# Patient Record
Sex: FEMALE | Race: WHITE | NOT HISPANIC OR LATINO | Employment: FULL TIME | URBAN - METROPOLITAN AREA
[De-identification: names, ages, dates, MRNs, and addresses within clinical notes are randomized per-mention and may not be internally consistent; named-entity substitution may affect disease eponyms.]

---

## 2023-01-23 PROBLEM — G43.909 MIGRAINES: Status: ACTIVE | Noted: 2023-01-23

## 2023-01-23 PROBLEM — M79.605 PAIN IN BOTH LOWER EXTREMITIES: Status: ACTIVE | Noted: 2023-01-23

## 2023-01-23 PROBLEM — M79.604 PAIN IN BOTH LOWER EXTREMITIES: Status: ACTIVE | Noted: 2023-01-23

## 2023-01-23 PROBLEM — G47.33 SEVERE OBSTRUCTIVE SLEEP APNEA: Status: ACTIVE | Noted: 2023-01-23

## 2023-01-23 PROBLEM — F33.1 MODERATE EPISODE OF RECURRENT MAJOR DEPRESSIVE DISORDER (MULTI): Status: ACTIVE | Noted: 2023-01-23

## 2023-01-23 PROBLEM — J01.90 ACUTE SINUSITIS: Status: ACTIVE | Noted: 2023-01-23

## 2023-01-23 PROBLEM — F41.9 ANXIETY DISORDER: Status: ACTIVE | Noted: 2023-01-23

## 2023-01-23 PROBLEM — I82.409 DVT (DEEP VENOUS THROMBOSIS) (MULTI): Status: ACTIVE | Noted: 2023-01-23

## 2023-01-23 PROBLEM — J30.89 ENVIRONMENTAL AND SEASONAL ALLERGIES: Status: ACTIVE | Noted: 2023-01-23

## 2023-01-23 PROBLEM — R19.7 DIARRHEA: Status: ACTIVE | Noted: 2023-01-23

## 2023-01-23 PROBLEM — I26.99 PULMONARY EMBOLISM (MULTI): Status: ACTIVE | Noted: 2023-01-23

## 2023-01-23 RX ORDER — MONTELUKAST SODIUM 10 MG/1
1 TABLET ORAL DAILY
COMMUNITY
Start: 2020-05-28 | End: 2023-04-13

## 2023-01-23 RX ORDER — TOPIRAMATE 200 MG/1
1 TABLET ORAL NIGHTLY
COMMUNITY
End: 2023-03-07 | Stop reason: SINTOL

## 2023-01-23 RX ORDER — DULOXETIN HYDROCHLORIDE 30 MG/1
1 CAPSULE, DELAYED RELEASE ORAL DAILY
COMMUNITY
End: 2023-03-07 | Stop reason: SDUPTHER

## 2023-01-23 RX ORDER — MULTIVITAMIN
1 TABLET ORAL DAILY
COMMUNITY

## 2023-01-23 RX ORDER — CETIRIZINE HYDROCHLORIDE 10 MG/1
1 TABLET ORAL DAILY PRN
COMMUNITY

## 2023-01-23 RX ORDER — ACETAMINOPHEN 500 MG/1
CAPSULE, LIQUID FILLED ORAL
COMMUNITY
End: 2024-01-23 | Stop reason: HOSPADM

## 2023-01-23 RX ORDER — ERGOCALCIFEROL 1.25 MG/1
1 CAPSULE ORAL
COMMUNITY
Start: 2018-08-23 | End: 2024-01-05 | Stop reason: SDUPTHER

## 2023-01-23 RX ORDER — ATENOLOL 25 MG/1
1 TABLET ORAL DAILY
COMMUNITY
Start: 2022-02-23 | End: 2023-04-13 | Stop reason: SDUPTHER

## 2023-01-23 RX ORDER — LORATADINE 10 MG/1
1 CAPSULE, LIQUID FILLED ORAL DAILY
COMMUNITY

## 2023-03-07 ENCOUNTER — OFFICE VISIT (OUTPATIENT)
Dept: PRIMARY CARE | Facility: CLINIC | Age: 43
End: 2023-03-07
Payer: COMMERCIAL

## 2023-03-07 VITALS
RESPIRATION RATE: 16 BRPM | HEART RATE: 78 BPM | SYSTOLIC BLOOD PRESSURE: 109 MMHG | HEIGHT: 66 IN | BODY MASS INDEX: 33.11 KG/M2 | DIASTOLIC BLOOD PRESSURE: 78 MMHG | TEMPERATURE: 97.4 F | WEIGHT: 206 LBS | OXYGEN SATURATION: 98 %

## 2023-03-07 DIAGNOSIS — I26.99 PULMONARY EMBOLISM WITHOUT ACUTE COR PULMONALE, UNSPECIFIED CHRONICITY, UNSPECIFIED PULMONARY EMBOLISM TYPE (MULTI): ICD-10-CM

## 2023-03-07 DIAGNOSIS — G43.709 CHRONIC MIGRAINE WITHOUT AURA WITHOUT STATUS MIGRAINOSUS, NOT INTRACTABLE: ICD-10-CM

## 2023-03-07 DIAGNOSIS — F41.1 GENERALIZED ANXIETY DISORDER: ICD-10-CM

## 2023-03-07 DIAGNOSIS — G47.33 SEVERE OBSTRUCTIVE SLEEP APNEA: ICD-10-CM

## 2023-03-07 DIAGNOSIS — F33.1 MODERATE EPISODE OF RECURRENT MAJOR DEPRESSIVE DISORDER (MULTI): Primary | ICD-10-CM

## 2023-03-07 PROBLEM — J01.90 ACUTE SINUSITIS: Status: RESOLVED | Noted: 2023-01-23 | Resolved: 2023-03-07

## 2023-03-07 PROBLEM — R19.7 DIARRHEA: Status: RESOLVED | Noted: 2023-01-23 | Resolved: 2023-03-07

## 2023-03-07 PROCEDURE — 99214 OFFICE O/P EST MOD 30 MIN: CPT | Performed by: FAMILY MEDICINE

## 2023-03-07 PROCEDURE — 1036F TOBACCO NON-USER: CPT | Performed by: FAMILY MEDICINE

## 2023-03-07 RX ORDER — DULOXETIN HYDROCHLORIDE 60 MG/1
60 CAPSULE, DELAYED RELEASE ORAL DAILY
Qty: 30 CAPSULE | Refills: 11 | Status: SHIPPED | OUTPATIENT
Start: 2023-03-07 | End: 2023-09-07 | Stop reason: SDUPTHER

## 2023-03-07 ASSESSMENT — PAIN SCALES - GENERAL: PAINLEVEL: 0-NO PAIN

## 2023-03-07 NOTE — ASSESSMENT & PLAN NOTE
Anticoagulated on Xarelto.   Hypercoag workup from hematology was negative.  Vascular recommended life-long anticoagulation.

## 2023-03-07 NOTE — PROGRESS NOTES
Subjective   Patient ID: Michelle Gonzales is a 42 y.o. female who presents for Depression, Anxiety (Memory loss  with depression), and Migraine.  She is currently experiencing migraines with changes in barometric pressure. She is having about 1 migraine per week which can last for several days. She has been off Nurtec due to cost. Savings card given today. She is on topiramate for migraine prevention. She notes brain fog and cognitive issues for several months and wasn't sure if it is from topiramate or duloxitine.    She still has symptoms of depression and anxiety but overall she feels that her symptoms are controlled on duloxitine. The atenolol she is on helps with the physical symptoms of anxiety.         Current Outpatient Medications   Medication Sig Dispense Refill    acetaminophen 500 mg capsule TAKE AS DIRECTED.      atenolol (Tenormin) 25 mg tablet Take 1 tablet (25 mg) by mouth once daily.      cetirizine (ZyrTEC) 10 mg tablet Take 1 tablet (10 mg) by mouth once daily as needed.      ergocalciferol (Vitamin D-2) 1.25 MG (88002 UT) capsule Take 1 capsule (1,250 mcg) by mouth 1 (one) time per week.      loratadine (Claritin Liqui-Gel) 10 mg capsule Take 1 capsule by mouth 1 (one) time each day.      montelukast (Singulair) 10 mg tablet Take 1 tablet (10 mg) by mouth once daily. For allergies or asthma      multivitamin tablet Take 1 tablet by mouth once daily.      rimegepant (NURTEC) 75 mg tablet,disintegrating Take 1 tablet (75 mg) by mouth every other day. For migraine prevention      rivaroxaban (Xarelto) 10 mg tablet Take 1 tablet (10 mg) by mouth once daily.      DULoxetine (Cymbalta) 60 mg DR capsule Take 1 capsule (60 mg) by mouth once daily. 30 capsule 11     No current facility-administered medications for this visit.     Past Surgical History:   Procedure Laterality Date    OTHER SURGICAL HISTORY  10/04/2019    Bunionectomy    OTHER SURGICAL HISTORY  10/04/2019    Myringotomy with tube placement  "   OTHER SURGICAL HISTORY  09/15/2020    Colonoscopy    OTHER SURGICAL HISTORY  09/15/2020    Esophagogastroduodenoscopy    OTHER SURGICAL HISTORY  09/15/2020    [place] tooth extraction    OTHER SURGICAL HISTORY  02/23/2022    Total hysterectomy laparoscopic    OTHER SURGICAL HISTORY  06/14/2021    Ablation    OTHER SURGICAL HISTORY  11/02/2021    Bilateral salpingectomy    OTHER SURGICAL HISTORY  02/09/2021    Cholecystectomy    OTHER SURGICAL HISTORY  11/19/2019    Vascular surgical procedure     Family History   Problem Relation Name Age of Onset    Anxiety disorder Mother      Depression Mother      Hypertension Mother      Lung cancer Mother      Other (cardiac disorder) Father      Other (substance abuse) Brother      Stroke Maternal Grandmother      Coronary artery disease Maternal Grandmother      Hypertension Maternal Grandmother        Social History     Tobacco Use    Smoking status: Never    Smokeless tobacco: Never   Substance Use Topics    Alcohol use: Yes     Comment: social    Drug use: Never        Objective     Visit Vitals  /78 (BP Location: Left arm, Patient Position: Sitting, BP Cuff Size: Adult)   Pulse 78   Temp 36.3 °C (97.4 °F) (Temporal)   Resp 16   Ht 1.664 m (5' 5.5\")   Wt 93.4 kg (206 lb)   SpO2 98%   BMI 33.76 kg/m²   Smoking Status Never   BSA 2.08 m²        Constitutional: Well nourished, well developed, appears stated age  Eyes: no scleral icterus, no conjunctival injection  Respiratory: normal respiratory effort  Musculoskeletal: No gross deformities appreciated  Skin: Warm, dry.  Neurologic: Alert, CNs II-XII grossly intact..  Psych: Appropriate mood and affect.         Assessment/Plan   Problem List Items Addressed This Visit          Nervous    Severe obstructive sleep apnea     Dx: 8/2021  Uses CPAP            Circulatory    Pulmonary embolism (CMS/HCC)     Anticoagulated on Xarelto.   Hypercoag workup from hematology was negative.  Vascular recommended life-long " anticoagulation.            Other    Anxiety disorder     Controlled with Cymbalta and atenolol. Continue.         Migraines     Having cognitive symptoms possibly from topiramate, will wean her off this medication and increase atenolol which she is on for anxiety to try to help with migraine prevention. Patient will restart Nurtec if affordable with savings card.          Moderate episode of recurrent major depressive disorder (CMS/HCC) - Primary     Controlled on Cymbalta. Continue         Relevant Medications    DULoxetine (Cymbalta) 60 mg DR capsule     Follow up 6 months    Marlene Valderrama,

## 2023-03-07 NOTE — PATIENT INSTRUCTIONS
Thank you for choosing [facility] for your healthcare.   As always if you have any questions or concerns please do not hesitate to call our office at [phone] or through [employer].    Have a great day!  Marlene Valderrama, DO

## 2023-03-07 NOTE — ASSESSMENT & PLAN NOTE
Having cognitive symptoms possibly from topiramate, will wean her off this medication and increase atenolol which she is on for anxiety to try to help with migraine prevention. Patient will restart Nurtec if affordable with savings card.

## 2023-03-27 ENCOUNTER — PATIENT MESSAGE (OUTPATIENT)
Dept: PRIMARY CARE | Facility: CLINIC | Age: 43
End: 2023-03-27
Payer: COMMERCIAL

## 2023-04-10 DIAGNOSIS — J30.89 ENVIRONMENTAL AND SEASONAL ALLERGIES: Primary | ICD-10-CM

## 2023-04-12 ENCOUNTER — PATIENT MESSAGE (OUTPATIENT)
Dept: PRIMARY CARE | Facility: CLINIC | Age: 43
End: 2023-04-12
Payer: COMMERCIAL

## 2023-04-12 DIAGNOSIS — F41.1 GENERALIZED ANXIETY DISORDER: Primary | ICD-10-CM

## 2023-04-12 DIAGNOSIS — G43.709 CHRONIC MIGRAINE WITHOUT AURA WITHOUT STATUS MIGRAINOSUS, NOT INTRACTABLE: ICD-10-CM

## 2023-04-13 RX ORDER — AMITRIPTYLINE HYDROCHLORIDE 10 MG/1
10 TABLET, FILM COATED ORAL NIGHTLY
Qty: 30 TABLET | Refills: 2 | Status: SHIPPED | OUTPATIENT
Start: 2023-04-13 | End: 2023-06-30

## 2023-04-13 RX ORDER — ONDANSETRON 4 MG/1
TABLET, ORALLY DISINTEGRATING ORAL
COMMUNITY
Start: 2023-03-29 | End: 2023-09-07 | Stop reason: WASHOUT

## 2023-04-13 RX ORDER — ATENOLOL 50 MG/1
50 TABLET ORAL DAILY
Qty: 90 TABLET | Refills: 3 | Status: SHIPPED | OUTPATIENT
Start: 2023-04-13 | End: 2023-09-07 | Stop reason: SDUPTHER

## 2023-04-13 RX ORDER — TOPIRAMATE 200 MG/1
1 TABLET ORAL NIGHTLY
COMMUNITY
Start: 2022-12-11 | End: 2023-04-13 | Stop reason: SINTOL

## 2023-04-13 RX ORDER — MONTELUKAST SODIUM 10 MG/1
TABLET ORAL
Qty: 90 TABLET | Refills: 1 | Status: SHIPPED | OUTPATIENT
Start: 2023-04-13 | End: 2023-09-07 | Stop reason: SDUPTHER

## 2023-04-13 NOTE — TELEPHONE ENCOUNTER
From: Michelle Gonzales  To: Marlene Valderrama DO  Sent: 4/12/2023 5:10 PM EDT  Subject: New prescription inquiry    Good afternoon, Dr. Valderrama,    I wanted to provide you an update & also ask a question.     The Atenolol has been helping now that we have increased the dosage. I will be needing a script of the increased Atenolol soon as I will be out in a week or two.     Since stopping the Topirimate, I am still experiencing my migraines (at least one every other week). I have noticed, though, that I am able to think more clearly and I have better recollection, so thank you for taking me off of that.    My insurance will only cover one box of the Nurtec at a time, so unfortunately, I am not able to use it as a preventative migraine medication. The Nurtec works great after I get my migraine, but it takes a little bit of time to get in my system & by that point, I am unable to drive to work.     When I had a migraine a couple of weeks ago & went to Urgent Care, the N.P. advised of a migraine preventative that she wanted me to discuss with you.   The medication is called Amitriptyline . I am not sure if this is something I could take with my other medication or if there are other side effects I need to avoid. For example, brain fog, etc.     I thought I would at least ask, since I am still getting the migraines.    Thank you for your time and consideration.    Sincerely,  Michelle Gonzales

## 2023-06-13 ENCOUNTER — TELEPHONE (OUTPATIENT)
Dept: PRIMARY CARE | Facility: CLINIC | Age: 43
End: 2023-06-13
Payer: COMMERCIAL

## 2023-06-13 NOTE — TELEPHONE ENCOUNTER
Email from patient:    Mauri Valderrama,    I just wanted to touch base with you.  After we had changed my medications slightly, I wanted to give you an update:    60mg of Duloxetine = seems to be working alright.    10mg of Amitriptyline = seems to be working very well.  I have only had 1 migraine last month & hardly any headaches.      Thank you for your time.    P.S. - In addition, we had upped my dosage of Atenolol from 25mg to 50mg.  I have been taking two of my 25mg pills a day.  This new dosage seems to have been helping.  I still have anxiety, but my panic attacks have decreased, per week.      Thank you, again!    Sincerely,  Michelle

## 2023-06-13 NOTE — TELEPHONE ENCOUNTER
My emailed response:    Good morning Michelle,  Thank you for the update. I am so glad that the changes we made have been helpful.    Marlene Valderrama, DO

## 2023-06-30 DIAGNOSIS — G43.709 CHRONIC MIGRAINE WITHOUT AURA WITHOUT STATUS MIGRAINOSUS, NOT INTRACTABLE: ICD-10-CM

## 2023-06-30 PROBLEM — R10.30 LOWER ABDOMINAL PAIN: Status: ACTIVE | Noted: 2023-06-30

## 2023-06-30 PROBLEM — J01.90 ACUTE SINUSITIS: Status: ACTIVE | Noted: 2023-06-30

## 2023-06-30 PROBLEM — J32.9 RECURRENT SINUSITIS: Status: ACTIVE | Noted: 2023-06-30

## 2023-06-30 PROBLEM — R05.9 COUGH: Status: ACTIVE | Noted: 2023-06-30

## 2023-06-30 PROBLEM — R19.7 DIARRHEA: Status: ACTIVE | Noted: 2023-06-30

## 2023-06-30 PROBLEM — L98.9 UNKNOWN SKIN LESION: Status: ACTIVE | Noted: 2023-06-30

## 2023-06-30 PROBLEM — N83.202 HEMORRHAGIC CYST OF LEFT OVARY: Status: ACTIVE | Noted: 2023-06-30

## 2023-06-30 PROBLEM — M25.512 ACUTE PAIN OF LEFT SHOULDER: Status: ACTIVE | Noted: 2023-06-30

## 2023-06-30 PROBLEM — J06.9 URI WITH COUGH AND CONGESTION: Status: ACTIVE | Noted: 2023-06-30

## 2023-06-30 RX ORDER — PREDNISONE 20 MG/1
2 TABLET ORAL DAILY
COMMUNITY
Start: 2023-06-25 | End: 2023-09-07 | Stop reason: WASHOUT

## 2023-06-30 RX ORDER — OXYMETAZOLINE HYDROCHLORIDE 0.05 G/100ML
SPRAY, METERED NASAL
COMMUNITY
Start: 2022-10-18

## 2023-06-30 RX ORDER — METHYLPREDNISOLONE 4 MG/1
4 TABLET ORAL
COMMUNITY
Start: 2023-05-01 | End: 2023-09-07 | Stop reason: WASHOUT

## 2023-06-30 RX ORDER — OXYCODONE HYDROCHLORIDE 5 MG/1
5 TABLET ORAL EVERY 4 HOURS PRN
COMMUNITY
Start: 2023-06-09 | End: 2023-09-07 | Stop reason: WASHOUT

## 2023-06-30 RX ORDER — AMITRIPTYLINE HYDROCHLORIDE 10 MG/1
TABLET, FILM COATED ORAL
Qty: 30 TABLET | Refills: 2 | Status: SHIPPED | OUTPATIENT
Start: 2023-06-30 | End: 2023-09-07 | Stop reason: SDUPTHER

## 2023-06-30 RX ORDER — METHOCARBAMOL 750 MG/1
1 TABLET, FILM COATED ORAL 3 TIMES DAILY
COMMUNITY
Start: 2023-06-25 | End: 2023-09-07 | Stop reason: WASHOUT

## 2023-09-07 ENCOUNTER — LAB (OUTPATIENT)
Dept: LAB | Facility: LAB | Age: 43
End: 2023-09-07
Payer: COMMERCIAL

## 2023-09-07 ENCOUNTER — OFFICE VISIT (OUTPATIENT)
Dept: PRIMARY CARE | Facility: CLINIC | Age: 43
End: 2023-09-07
Payer: COMMERCIAL

## 2023-09-07 VITALS
HEIGHT: 66 IN | SYSTOLIC BLOOD PRESSURE: 124 MMHG | OXYGEN SATURATION: 100 % | TEMPERATURE: 97.6 F | BODY MASS INDEX: 34.07 KG/M2 | HEART RATE: 66 BPM | DIASTOLIC BLOOD PRESSURE: 78 MMHG | RESPIRATION RATE: 16 BRPM | WEIGHT: 212 LBS

## 2023-09-07 DIAGNOSIS — G43.709 CHRONIC MIGRAINE WITHOUT AURA WITHOUT STATUS MIGRAINOSUS, NOT INTRACTABLE: ICD-10-CM

## 2023-09-07 DIAGNOSIS — G47.33 SEVERE OBSTRUCTIVE SLEEP APNEA: ICD-10-CM

## 2023-09-07 DIAGNOSIS — L65.9 HAIR LOSS: ICD-10-CM

## 2023-09-07 DIAGNOSIS — R63.5 WEIGHT GAIN: ICD-10-CM

## 2023-09-07 DIAGNOSIS — F41.1 GENERALIZED ANXIETY DISORDER: ICD-10-CM

## 2023-09-07 DIAGNOSIS — Z13.1 SCREENING FOR DIABETES MELLITUS: ICD-10-CM

## 2023-09-07 DIAGNOSIS — J30.89 ENVIRONMENTAL AND SEASONAL ALLERGIES: ICD-10-CM

## 2023-09-07 DIAGNOSIS — F33.1 MODERATE EPISODE OF RECURRENT MAJOR DEPRESSIVE DISORDER (MULTI): Primary | ICD-10-CM

## 2023-09-07 DIAGNOSIS — R68.89 HEAT INTOLERANCE: ICD-10-CM

## 2023-09-07 PROBLEM — G43.909 MIGRAINES: Chronic | Status: ACTIVE | Noted: 2023-01-23

## 2023-09-07 PROBLEM — M25.512 ACUTE PAIN OF LEFT SHOULDER: Status: RESOLVED | Noted: 2023-06-30 | Resolved: 2023-09-07

## 2023-09-07 PROBLEM — F41.9 ANXIETY DISORDER: Chronic | Status: ACTIVE | Noted: 2023-01-23

## 2023-09-07 PROBLEM — J32.9 RECURRENT SINUSITIS: Status: RESOLVED | Noted: 2023-06-30 | Resolved: 2023-09-07

## 2023-09-07 PROBLEM — J06.9 URI WITH COUGH AND CONGESTION: Status: RESOLVED | Noted: 2023-06-30 | Resolved: 2023-09-07

## 2023-09-07 PROBLEM — L98.9 UNKNOWN SKIN LESION: Status: RESOLVED | Noted: 2023-06-30 | Resolved: 2023-09-07

## 2023-09-07 PROBLEM — R10.30 LOWER ABDOMINAL PAIN: Status: RESOLVED | Noted: 2023-06-30 | Resolved: 2023-09-07

## 2023-09-07 PROBLEM — J01.90 ACUTE SINUSITIS: Status: RESOLVED | Noted: 2023-06-30 | Resolved: 2023-09-07

## 2023-09-07 PROBLEM — R05.9 COUGH: Status: RESOLVED | Noted: 2023-06-30 | Resolved: 2023-09-07

## 2023-09-07 LAB
FOLLITROPIN (IU/L) IN SER/PLAS: 3.5 IU/L
LUTEINIZING HORMONE (IU/ML) IN SER/PLAS: 3.4 IU/L
PROLACTIN (UG/L) IN SER/PLAS: 13.1 UG/L (ref 3–20)

## 2023-09-07 PROCEDURE — 84443 ASSAY THYROID STIM HORMONE: CPT

## 2023-09-07 PROCEDURE — 83525 ASSAY OF INSULIN: CPT

## 2023-09-07 PROCEDURE — 83001 ASSAY OF GONADOTROPIN (FSH): CPT

## 2023-09-07 PROCEDURE — 83036 HEMOGLOBIN GLYCOSYLATED A1C: CPT

## 2023-09-07 PROCEDURE — 84146 ASSAY OF PROLACTIN: CPT

## 2023-09-07 PROCEDURE — 36415 COLL VENOUS BLD VENIPUNCTURE: CPT

## 2023-09-07 PROCEDURE — 99214 OFFICE O/P EST MOD 30 MIN: CPT | Performed by: FAMILY MEDICINE

## 2023-09-07 PROCEDURE — 1036F TOBACCO NON-USER: CPT | Performed by: FAMILY MEDICINE

## 2023-09-07 PROCEDURE — 83002 ASSAY OF GONADOTROPIN (LH): CPT

## 2023-09-07 RX ORDER — AMITRIPTYLINE HYDROCHLORIDE 10 MG/1
10 TABLET, FILM COATED ORAL NIGHTLY
Qty: 90 TABLET | Refills: 3 | Status: SHIPPED | OUTPATIENT
Start: 2023-09-07 | End: 2024-03-21 | Stop reason: SDUPTHER

## 2023-09-07 RX ORDER — MONTELUKAST SODIUM 10 MG/1
TABLET ORAL
Qty: 90 TABLET | Refills: 3 | Status: SHIPPED | OUTPATIENT
Start: 2023-09-07 | End: 2024-03-21 | Stop reason: SDUPTHER

## 2023-09-07 RX ORDER — ATENOLOL 50 MG/1
50 TABLET ORAL DAILY
Qty: 90 TABLET | Refills: 3 | Status: SHIPPED | OUTPATIENT
Start: 2023-09-07 | End: 2024-03-21 | Stop reason: SDUPTHER

## 2023-09-07 RX ORDER — DULOXETIN HYDROCHLORIDE 60 MG/1
60 CAPSULE, DELAYED RELEASE ORAL DAILY
Qty: 90 CAPSULE | Refills: 3 | Status: SHIPPED | OUTPATIENT
Start: 2023-09-07 | End: 2024-03-21 | Stop reason: SDUPTHER

## 2023-09-07 RX ORDER — DIPHENHYDRAMINE HYDROCHLORIDE 12.5 MG/1
12.5 BAR, CHEWABLE ORAL EVERY 6 HOURS PRN
COMMUNITY

## 2023-09-07 SDOH — ECONOMIC STABILITY: FOOD INSECURITY: WITHIN THE PAST 12 MONTHS, YOU WORRIED THAT YOUR FOOD WOULD RUN OUT BEFORE YOU GOT MONEY TO BUY MORE.: NEVER TRUE

## 2023-09-07 SDOH — ECONOMIC STABILITY: FOOD INSECURITY: WITHIN THE PAST 12 MONTHS, THE FOOD YOU BOUGHT JUST DIDN'T LAST AND YOU DIDN'T HAVE MONEY TO GET MORE.: NEVER TRUE

## 2023-09-07 ASSESSMENT — PATIENT HEALTH QUESTIONNAIRE - PHQ9
SUM OF ALL RESPONSES TO PHQ9 QUESTIONS 1 & 2: 2
2. FEELING DOWN, DEPRESSED OR HOPELESS: SEVERAL DAYS
2. FEELING DOWN, DEPRESSED OR HOPELESS: SEVERAL DAYS
1. LITTLE INTEREST OR PLEASURE IN DOING THINGS: SEVERAL DAYS
1. LITTLE INTEREST OR PLEASURE IN DOING THINGS: SEVERAL DAYS
SUM OF ALL RESPONSES TO PHQ9 QUESTIONS 1 & 2: 2
10. IF YOU CHECKED OFF ANY PROBLEMS, HOW DIFFICULT HAVE THESE PROBLEMS MADE IT FOR YOU TO DO YOUR WORK, TAKE CARE OF THINGS AT HOME, OR GET ALONG WITH OTHER PEOPLE: SOMEWHAT DIFFICULT
10. IF YOU CHECKED OFF ANY PROBLEMS, HOW DIFFICULT HAVE THESE PROBLEMS MADE IT FOR YOU TO DO YOUR WORK, TAKE CARE OF THINGS AT HOME, OR GET ALONG WITH OTHER PEOPLE: SOMEWHAT DIFFICULT

## 2023-09-07 ASSESSMENT — LIFESTYLE VARIABLES
HOW OFTEN DO YOU HAVE A DRINK CONTAINING ALCOHOL: 2-4 TIMES A MONTH
HOW MANY STANDARD DRINKS CONTAINING ALCOHOL DO YOU HAVE ON A TYPICAL DAY: 1 OR 2
AUDIT-C TOTAL SCORE: 2
SKIP TO QUESTIONS 9-10: 1
HOW OFTEN DO YOU HAVE SIX OR MORE DRINKS ON ONE OCCASION: NEVER

## 2023-09-07 ASSESSMENT — PAIN SCALES - GENERAL: PAINLEVEL: 3

## 2023-09-07 ASSESSMENT — ENCOUNTER SYMPTOMS
DEPRESSION: 0
OCCASIONAL FEELINGS OF UNSTEADINESS: 0
LOSS OF SENSATION IN FEET: 0

## 2023-09-07 NOTE — PATIENT INSTRUCTIONS
Thank you for choosing [facility] for your healthcare.   As always if you have any questions or concerns please do not hesitate to call our office at [phone] or through [employer].    Have a great day!  Marlene Valderrama, DO

## 2023-09-07 NOTE — PROGRESS NOTES
Subjective   Patient ID: Michelle Gonzales is a 43 y.o. female who presents for Follow-up, Allergies (Seasonal allergies), and Excessive Sweating (X 6 months.).  Migraines  Her migraines are much better since starting amitriptyline for prevention. They are occurring less often.    SAHIL  CPAP has helped a lot with her daytime sleepiness. Once a week, she will have a long sleep where she sleeps in until 3 PM.    For the past 6-7 months she has noticed an increase in sweating. She gets heated more easily that others and is often hot when others feels fine or are even feeling cold. She had a hysterectomy in 2021. Her ovaries were not removed. She has been experiencing hair loss, intermittent constipation, and dry skin and isn't sure if these symptoms are related. She is starting to gain back some of the weight that she has been able to lose. She is no longer working at [employer]. 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 She is worn out by the end of the day and lacks motivation to get things done at home.         Current Outpatient Medications   Medication Sig Dispense Refill    acetaminophen 500 mg capsule TAKE AS DIRECTED.      Afrin No Drip,oxymetazolin, 0.05 % mist USE 2 SPRAYS IN EACH NOSTRIL 2 TIMES A DAY FOR 3 DAYS      cetirizine (ZyrTEC) 10 mg tablet Take 1 tablet (10 mg) by mouth once daily as needed.      diphenhydrAMINE (BENADryl) 12.5 mg chewable tablet Chew 1 tablet (12.5 mg) every 6 hours if needed for allergies.      ELDERBERRY FRUIT ORAL Take by mouth.      ergocalciferol (Vitamin D-2) 1.25 MG (92971 UT) capsule Take 1 capsule (1,250 mcg) by mouth 1 (one) time per week.      loratadine (Claritin Liqui-Gel) 10 mg capsule Take 1 capsule by mouth 1 (one) time each day.      multivitamin tablet Take 1 tablet by mouth once daily.      rimegepant (NURTEC) 75 mg tablet,disintegrating Take 1 tablet (75 mg) by mouth every other day. For migraine prevention      rivaroxaban (Xarelto) 10 mg  "tablet Take 1 tablet (10 mg) by mouth once daily.      amitriptyline (Elavil) 10 mg tablet Take 1 tablet (10 mg) by mouth once daily at bedtime. 90 tablet 3    atenolol (Tenormin) 50 mg tablet Take 1 tablet (50 mg) by mouth once daily. 90 tablet 3    DULoxetine (Cymbalta) 60 mg DR capsule Take 1 capsule (60 mg) by mouth once daily. 90 capsule 3    montelukast (Singulair) 10 mg tablet Take 1 tablet by mouth every day for allergies or asthma 90 tablet 3     No current facility-administered medications for this visit.       Objective     Visit Vitals  /78 (BP Location: Left arm, Patient Position: Sitting, BP Cuff Size: Large adult)   Pulse 66   Temp 36.4 °C (97.6 °F)   Resp 16   Ht 1.664 m (5' 5.5\")   Wt 96.2 kg (212 lb)   SpO2 100%   BMI 34.74 kg/m²   Smoking Status Never   BSA 2.11 m²        Constitutional: Well nourished, well developed, appears stated age  Eyes: no scleral icterus, no conjunctival injection  Respiratory: normal respiratory effort  Musculoskeletal: No gross deformities appreciated  Skin: Warm, dry.  Neurologic: Alert, CNs II-XII grossly intact..  Psych: Appropriate mood and affect.        Assessment/Plan   Problem List Items Addressed This Visit       Anxiety disorder (Chronic)     Controlled with Cymbalta and atenolol. Continue.         Relevant Medications    atenolol (Tenormin) 50 mg tablet    Environmental and seasonal allergies    Relevant Medications    montelukast (Singulair) 10 mg tablet    Migraines (Chronic)     Stable on amitriptyline and atenolol         Relevant Medications    amitriptyline (Elavil) 10 mg tablet    Moderate episode of recurrent major depressive disorder (CMS/HCC) - Primary (Chronic)     Controlled on Cymbalta. Continue  Experiencing some lack of motivation during the weekends and when done with work but is working 10-11 hour shifts and the job is fast pace so her feelings may be considered normal in that setting.         Relevant Medications    DULoxetine " (Cymbalta) 60 mg DR capsule    Severe obstructive sleep apnea (Chronic)     Dx: 8/2021  Uses CPAP with significant improvement in daytime sleepiness/fatigue          Other Visit Diagnoses       Screening for diabetes mellitus        Relevant Orders    Hemoglobin A1c    Hair loss        Relevant Orders    TSH    Weight gain        Relevant Orders    TSH    Insulin, Fasting    Heat intolerance        Relevant Orders    TSH    Luteinizing hormone    FSH    Prolactin level            Follow up 6 months.    Marlene Valderrama,

## 2023-09-08 LAB
ESTIMATED AVERAGE GLUCOSE FOR HBA1C: 114 MG/DL
HEMOGLOBIN A1C/HEMOGLOBIN TOTAL IN BLOOD: 5.6 %
INSULIN, FASTING: 3 UIU/ML (ref 3–25)
THYROTROPIN (MIU/L) IN SER/PLAS BY DETECTION LIMIT <= 0.05 MIU/L: 1.42 MIU/L (ref 0.44–3.98)

## 2023-09-08 NOTE — ASSESSMENT & PLAN NOTE
Controlled on Cymbalta. Continue  Experiencing some lack of motivation during the weekends and when done with work but is working 10-11 hour shifts and the job is fast pace so her feelings may be considered normal in that setting.

## 2023-09-11 ENCOUNTER — PATIENT MESSAGE (OUTPATIENT)
Dept: PRIMARY CARE | Facility: CLINIC | Age: 43
End: 2023-09-11
Payer: COMMERCIAL

## 2023-10-22 ASSESSMENT — ENCOUNTER SYMPTOMS
FLATUS: 1
FREQUENCY: 0
FEVER: 0
ARTHRALGIAS: 0
ABDOMINAL PAIN: 1
NAUSEA: 0
WEIGHT LOSS: 0
CONSTIPATION: 1
VOMITING: 0
DIARRHEA: 1
BELCHING: 0
DYSURIA: 0
MYALGIAS: 0
ANOREXIA: 0
HEMATOCHEZIA: 0
HEADACHES: 0
HEMATURIA: 0

## 2023-10-24 PROBLEM — Z01.419 WELL WOMAN EXAM: Status: ACTIVE | Noted: 2023-10-24

## 2023-10-25 ENCOUNTER — OFFICE VISIT (OUTPATIENT)
Dept: OBSTETRICS AND GYNECOLOGY | Facility: CLINIC | Age: 43
End: 2023-10-25
Payer: COMMERCIAL

## 2023-10-25 VITALS
HEIGHT: 66 IN | DIASTOLIC BLOOD PRESSURE: 70 MMHG | BODY MASS INDEX: 34.72 KG/M2 | WEIGHT: 216 LBS | SYSTOLIC BLOOD PRESSURE: 90 MMHG

## 2023-10-25 DIAGNOSIS — Z01.419 WELL WOMAN EXAM: Primary | ICD-10-CM

## 2023-10-25 DIAGNOSIS — R10.2 PELVIC PAIN: ICD-10-CM

## 2023-10-25 PROCEDURE — 99396 PREV VISIT EST AGE 40-64: CPT | Performed by: OBSTETRICS & GYNECOLOGY

## 2023-10-25 PROCEDURE — 1036F TOBACCO NON-USER: CPT | Performed by: OBSTETRICS & GYNECOLOGY

## 2023-10-25 NOTE — PROGRESS NOTES
Subjective   Patient ID: Michelle Gonzales is a 43 y.o. female who presents for Gynecologic Exam (C/O right side dull pain ).    43-year-old here for her annual physical exam.  Patient with prior hysterectomy.  Patient with complaint of intermittent pain, initially had pain in June and recurred in September.  Pain is currently a dull constant ache with occasional sharp shooting pain patient is tolerating okay, wants to make sure there is nothing wrong.  Patient does have a history of a PE and currently on Xarelto.  Patient did have an ultrasound and CT scan performed in June revealing an ovarian cyst.        Objective   Physical Exam  Exam conducted with a chaperone present.   Constitutional:       Appearance: Normal appearance.   Cardiovascular:      Rate and Rhythm: Normal rate and regular rhythm.   Pulmonary:      Effort: Pulmonary effort is normal.      Breath sounds: Normal breath sounds.   Chest:   Breasts:     Right: Normal. No mass or tenderness.      Left: Normal. No mass or tenderness.   Abdominal:      Palpations: Abdomen is soft. There is no mass.      Tenderness: There is no abdominal tenderness.   Genitourinary:     General: Normal vulva.      Vagina: Normal. No lesions.      Uterus: Absent.       Adnexa: Right adnexa normal and left adnexa normal.        Right: No mass or tenderness.          Left: No mass or tenderness.     Musculoskeletal:      Cervical back: Neck supple.   Skin:     General: Skin is warm and dry.   Neurological:      Mental Status: She is alert and oriented to person, place, and time.   Psychiatric:         Mood and Affect: Mood normal.         Behavior: Behavior normal.         Assessment/Plan   Problem List Items Addressed This Visit             ICD-10-CM    Well woman exam - Primary Z01.419     --ANNUAL EXAM: Doing well, has had some pelvic pain, will obtain routine mammogram  --PELVIC PAIN: Patient with episode of pain in June, then recurrence in September, still a dull constant  aching pain with occasional sharp pain.  We will obtain an ultrasound and call the patient if problems.  Patient will otherwise use Tylenol as needed.  --s/p HYSTERECTOMY: [facility] with Dr Carney in 2021  --Normal mammogram September 2021    PLAN:  Mammogram  Ultrasound will call if abnormal  Tylenol 1000 mg every 6 hours as needed for pain  Follow-up in 1 year         Relevant Orders    BI mammo bilateral screening tomosynthesis     Other Visit Diagnoses         Codes    Pelvic pain     R10.2    Relevant Orders    US PELVIS TRANSABDOMINAL WITH TRANSVAGINAL

## 2023-10-25 NOTE — ASSESSMENT & PLAN NOTE
--ANNUAL EXAM: Doing well, has had some pelvic pain, will obtain routine mammogram  --PELVIC PAIN: Patient with episode of pain in June, then recurrence in September, still a dull constant aching pain with occasional sharp pain.  We will obtain an ultrasound and call the patient if problems.  Patient will otherwise use Tylenol as needed.  --s/p HYSTERECTOMY: H with Dr Carney in 2021  --Normal mammogram September 2021    PLAN:  Mammogram  Ultrasound will call if abnormal  Tylenol 1000 mg every 6 hours as needed for pain  Follow-up in 1 year

## 2023-10-30 ENCOUNTER — HOSPITAL ENCOUNTER (EMERGENCY)
Facility: HOSPITAL | Age: 43
Discharge: HOME | End: 2023-10-31
Payer: COMMERCIAL

## 2023-10-30 ENCOUNTER — APPOINTMENT (OUTPATIENT)
Dept: RADIOLOGY | Facility: HOSPITAL | Age: 43
End: 2023-10-30
Payer: COMMERCIAL

## 2023-10-30 VITALS
TEMPERATURE: 98.1 F | SYSTOLIC BLOOD PRESSURE: 116 MMHG | HEIGHT: 65 IN | BODY MASS INDEX: 36.15 KG/M2 | HEART RATE: 78 BPM | OXYGEN SATURATION: 97 % | RESPIRATION RATE: 16 BRPM | DIASTOLIC BLOOD PRESSURE: 75 MMHG | WEIGHT: 217 LBS

## 2023-10-30 DIAGNOSIS — N83.202 HEMORRHAGIC CYST OF LEFT OVARY: Primary | ICD-10-CM

## 2023-10-30 LAB
ALBUMIN SERPL BCP-MCNC: 3.8 G/DL (ref 3.4–5)
ALP SERPL-CCNC: 88 U/L (ref 33–110)
ALT SERPL W P-5'-P-CCNC: 15 U/L (ref 7–45)
ANION GAP SERPL CALC-SCNC: 10 MMOL/L (ref 10–20)
APPEARANCE UR: ABNORMAL
AST SERPL W P-5'-P-CCNC: 16 U/L (ref 9–39)
BACTERIA #/AREA URNS AUTO: ABNORMAL /HPF
BASOPHILS # BLD AUTO: 0.03 X10*3/UL (ref 0–0.1)
BASOPHILS NFR BLD AUTO: 0.3 %
BILIRUB SERPL-MCNC: 0.7 MG/DL (ref 0–1.2)
BILIRUB UR STRIP.AUTO-MCNC: NEGATIVE MG/DL
BUN SERPL-MCNC: 13 MG/DL (ref 6–23)
CALCIUM SERPL-MCNC: 9 MG/DL (ref 8.6–10.3)
CHLORIDE SERPL-SCNC: 103 MMOL/L (ref 98–107)
CO2 SERPL-SCNC: 26 MMOL/L (ref 21–32)
COLOR UR: ABNORMAL
CREAT SERPL-MCNC: 0.83 MG/DL (ref 0.5–1.05)
EOSINOPHIL # BLD AUTO: 0.17 X10*3/UL (ref 0–0.7)
EOSINOPHIL NFR BLD AUTO: 1.8 %
ERYTHROCYTE [DISTWIDTH] IN BLOOD BY AUTOMATED COUNT: 14.6 % (ref 11.5–14.5)
GFR SERPL CREATININE-BSD FRML MDRD: 90 ML/MIN/1.73M*2
GLUCOSE SERPL-MCNC: 82 MG/DL (ref 74–99)
GLUCOSE UR STRIP.AUTO-MCNC: NEGATIVE MG/DL
HCG UR QL IA.RAPID: NEGATIVE
HCT VFR BLD AUTO: 40.1 % (ref 36–46)
HGB BLD-MCNC: 13 G/DL (ref 12–16)
IMM GRANULOCYTES # BLD AUTO: 0.02 X10*3/UL (ref 0–0.7)
IMM GRANULOCYTES NFR BLD AUTO: 0.2 % (ref 0–0.9)
KETONES UR STRIP.AUTO-MCNC: ABNORMAL MG/DL
LACTATE SERPL-SCNC: 0.6 MMOL/L (ref 0.4–2)
LEUKOCYTE ESTERASE UR QL STRIP.AUTO: NEGATIVE
LIPASE SERPL-CCNC: 9 U/L (ref 9–82)
LYMPHOCYTES # BLD AUTO: 2.47 X10*3/UL (ref 1.2–4.8)
LYMPHOCYTES NFR BLD AUTO: 26.4 %
MCH RBC QN AUTO: 26.5 PG (ref 26–34)
MCHC RBC AUTO-ENTMCNC: 32.4 G/DL (ref 32–36)
MCV RBC AUTO: 82 FL (ref 80–100)
MONOCYTES # BLD AUTO: 0.94 X10*3/UL (ref 0.1–1)
MONOCYTES NFR BLD AUTO: 10.1 %
MUCOUS THREADS #/AREA URNS AUTO: ABNORMAL /LPF
NEUTROPHILS # BLD AUTO: 5.71 X10*3/UL (ref 1.2–7.7)
NEUTROPHILS NFR BLD AUTO: 61.2 %
NITRITE UR QL STRIP.AUTO: NEGATIVE
NRBC BLD-RTO: 0 /100 WBCS (ref 0–0)
PH UR STRIP.AUTO: 5 [PH]
PLATELET # BLD AUTO: 331 X10*3/UL (ref 150–450)
PMV BLD AUTO: 10.3 FL (ref 7.5–11.5)
POTASSIUM SERPL-SCNC: 3.2 MMOL/L (ref 3.5–5.3)
PROT SERPL-MCNC: 6.8 G/DL (ref 6.4–8.2)
PROT UR STRIP.AUTO-MCNC: ABNORMAL MG/DL
RBC # BLD AUTO: 4.91 X10*6/UL (ref 4–5.2)
RBC # UR STRIP.AUTO: NEGATIVE /UL
RBC #/AREA URNS AUTO: ABNORMAL /HPF
SODIUM SERPL-SCNC: 136 MMOL/L (ref 136–145)
SP GR UR STRIP.AUTO: 1.03
SQUAMOUS #/AREA URNS AUTO: ABNORMAL /HPF
UROBILINOGEN UR STRIP.AUTO-MCNC: <2 MG/DL
WBC # BLD AUTO: 9.3 X10*3/UL (ref 4.4–11.3)
WBC #/AREA URNS AUTO: ABNORMAL /HPF

## 2023-10-30 PROCEDURE — 76856 US EXAM PELVIC COMPLETE: CPT | Performed by: SURGERY

## 2023-10-30 PROCEDURE — 99285 EMERGENCY DEPT VISIT HI MDM: CPT | Mod: 25 | Performed by: NURSE PRACTITIONER

## 2023-10-30 PROCEDURE — 81001 URINALYSIS AUTO W/SCOPE: CPT | Performed by: EMERGENCY MEDICINE

## 2023-10-30 PROCEDURE — 84075 ASSAY ALKALINE PHOSPHATASE: CPT | Performed by: EMERGENCY MEDICINE

## 2023-10-30 PROCEDURE — 81025 URINE PREGNANCY TEST: CPT | Performed by: EMERGENCY MEDICINE

## 2023-10-30 PROCEDURE — 74177 CT ABD & PELVIS W/CONTRAST: CPT

## 2023-10-30 PROCEDURE — 85025 COMPLETE CBC W/AUTO DIFF WBC: CPT | Performed by: EMERGENCY MEDICINE

## 2023-10-30 PROCEDURE — 2550000001 HC RX 255 CONTRASTS: Performed by: NURSE PRACTITIONER

## 2023-10-30 PROCEDURE — 76830 TRANSVAGINAL US NON-OB: CPT

## 2023-10-30 PROCEDURE — 76830 TRANSVAGINAL US NON-OB: CPT | Performed by: SURGERY

## 2023-10-30 PROCEDURE — 83690 ASSAY OF LIPASE: CPT | Performed by: NURSE PRACTITIONER

## 2023-10-30 PROCEDURE — 36415 COLL VENOUS BLD VENIPUNCTURE: CPT | Performed by: EMERGENCY MEDICINE

## 2023-10-30 PROCEDURE — 74177 CT ABD & PELVIS W/CONTRAST: CPT | Performed by: RADIOLOGY

## 2023-10-30 PROCEDURE — 83605 ASSAY OF LACTIC ACID: CPT | Performed by: EMERGENCY MEDICINE

## 2023-10-30 RX ADMIN — IOHEXOL 75 ML: 350 INJECTION, SOLUTION INTRAVENOUS at 21:43

## 2023-10-30 ASSESSMENT — COLUMBIA-SUICIDE SEVERITY RATING SCALE - C-SSRS
6. HAVE YOU EVER DONE ANYTHING, STARTED TO DO ANYTHING, OR PREPARED TO DO ANYTHING TO END YOUR LIFE?: NO
2. HAVE YOU ACTUALLY HAD ANY THOUGHTS OF KILLING YOURSELF?: NO
1. IN THE PAST MONTH, HAVE YOU WISHED YOU WERE DEAD OR WISHED YOU COULD GO TO SLEEP AND NOT WAKE UP?: NO

## 2023-10-30 ASSESSMENT — PAIN DESCRIPTION - DESCRIPTORS: DESCRIPTORS: CRAMPING

## 2023-10-30 NOTE — Clinical Note
Michelle Gonzales was seen and treated in our emergency department on 10/30/2023.  She may return to work on 11/02/2023.       If you have any questions or concerns, please don't hesitate to call.      Saira Velasquez, APRN-CNP

## 2023-10-31 NOTE — ED PROVIDER NOTES
HPI   Chief Complaint   Patient presents with    Abdominal Pain     Lower abdominal pain, started last week. Now has pain with urination and BM. Hx ovarian cyst       43-year-old female with past history of anxiety, migraine, PE on Xarelto Sree obstructive sleep apnea with a surgical history of hysterectomy presents to the emergency department today for left lower quadrant pain.  Patient states that she began having the discomfort last week and is now been having some diarrhea and abdominal cramping with bowel movements.  States that she did have slight pressure with urination today however it was during a bowel movement as well.  No fever or chills.  Denies nausea, vomiting, dizziness headache or syncope.  States that she had a chest pain or shortness of breath.  No blood or melena in her stools.  Denies any abnormal vaginal discharge or bleeding.  No concern for STDs.                          No data recorded                Patient History   Past Medical History:   Diagnosis Date    Abscess of eyelid right eye, unspecified eyelid 05/23/2022    Cellulitis of right eyelid    Acute maxillary sinusitis, unspecified 01/12/2022    Acute non-recurrent maxillary sinusitis    Acute upper respiratory infection, unspecified 12/29/2021    URI with cough and congestion    Allergic     Anxiety     Chronic embolism and thrombosis of unspecified deep veins of right lower extremity (CMS/HCC) 03/19/2020    Chronic deep vein thrombosis (DVT) of right lower extremity, unspecified vein    Depression     Encounter for screening for COVID-19 03/13/2021    Encounter for screening for COVID-19    Encounter for surveillance of contraceptive pills 08/03/2020    Encounter for birth control pills maintenance    Hypoactive sexual desire disorder 01/28/2020    Lack of libido    Localized edema 03/19/2020    Leg edema, right    Nausea with vomiting, unspecified 08/18/2021    Nausea, vomiting, and diarrhea    Other headache syndrome 12/29/2021     Other headache syndrome    Other hypersomnia 06/24/2021    Excessive daytime sleepiness    Other pulmonary embolism without acute cor pulmonale (CMS/HCC) 05/19/2021    Other acute pulmonary embolism without acute cor pulmonale    Other specified soft tissue disorders 06/16/2020    Swelling of both lower extremities    Pain, unspecified 08/07/2020    Body aches    Pelvic and perineal pain 11/02/2021    Pelvic pain in female    Peptic ulceration     Personal history of cervical dysplasia     History of cervical dysplasia    Personal history of diseases of the skin and subcutaneous tissue 01/28/2020    History of female hirsutism    Personal history of other diseases of the respiratory system     History of allergic rhinitis    Personal history of other diseases of the respiratory system     History of sinusitis    Personal history of other diseases of the respiratory system     History of bronchitis    Personal history of other mental and behavioral disorders     History of depression    Personal history of other mental and behavioral disorders     History of anxiety    Personal history of other specified conditions     History of insomnia    Personal history of other specified conditions 12/13/2021    History of urinary urgency    Personal history of other specified conditions 01/08/2021    History of headache    Personal history of other specified conditions 03/13/2021    History of headache    Personal history of other specified conditions 08/07/2020    History of fatigue    Personal history of pneumonia (recurrent) 01/12/2022    History of pneumonia    Personal history of pneumonia (recurrent)     History of pneumonia    Urinary tract infection      Past Surgical History:   Procedure Laterality Date    CHOLECYSTECTOMY      ENDOMETRIAL ABLATION      HYSTERECTOMY      OTHER SURGICAL HISTORY  10/04/2019    Bunionectomy    OTHER SURGICAL HISTORY  10/04/2019    Myringotomy with tube placement    OTHER SURGICAL HISTORY   09/15/2020    Colonoscopy    OTHER SURGICAL HISTORY  09/15/2020    Esophagogastroduodenoscopy    OTHER SURGICAL HISTORY  09/15/2020    [place] tooth extraction    OTHER SURGICAL HISTORY  2022    Total hysterectomy laparoscopic    OTHER SURGICAL HISTORY  2021    Ablation    OTHER SURGICAL HISTORY  2021    Bilateral salpingectomy    OTHER SURGICAL HISTORY  2021    Cholecystectomy    OTHER SURGICAL HISTORY  2019    Vascular surgical procedure    WISDOM TOOTH EXTRACTION       Family History   Problem Relation Name Age of Onset    Anxiety disorder Mother  - JHONATAN Farias     Depression Mother  - JHONATAN Farias     Hypertension Mother  - JHONATAN Farias     Lung cancer Mother  - JHONATAN Farias     Cancer Mother  - JHONATAN Farias     Other (cardiac disorder) Father Tad Farias     Hearing loss Father Tad Farias     Heart disease Father Tad Farias     Other (substance abuse) Brother Magno Jarrett     Alcohol abuse Brother Magno Jarrett     Stroke Maternal Grandmother  - [facility]     Coronary artery disease Maternal Grandmother  - [facility]     Hypertension Maternal Grandmother  - [facility]     Diabetes Maternal Grandmother  - [facility]     Heart disease Maternal Grandfather LILY Brizuela      Social History     Tobacco Use    Smoking status: Never     Passive exposure: Past    Smokeless tobacco: Never    Tobacco comments:     I was exposed to heavy second hand smoke until about the age of 17 or 18, but I have never smoked.   Vaping Use    Vaping Use: Never used   Substance Use Topics    Alcohol use: Yes     Alcohol/week: 4.0 standard drinks of alcohol     Types: 1 Glasses of wine, 2 Shots of liquor, 1 Standard drinks or equivalent per week     Comment: I am not much of a drinker.  The above is an overestimation.    Drug use: Never       Physical Exam   ED Triage Vitals [10/30/23 1924]    Temp Heart Rate Resp BP   36.7 °C (98.1 °F) 78 16 116/75      SpO2 Temp src Heart Rate Source Patient Position   97 % -- -- --      BP Location FiO2 (%)     -- --       Physical Exam  Vitals and nursing note reviewed.   Constitutional:       General: She is not in acute distress.     Appearance: Normal appearance. She is not toxic-appearing.   HENT:      Right Ear: Tympanic membrane normal.      Left Ear: Tympanic membrane normal.      Mouth/Throat:      Mouth: Mucous membranes are moist.      Pharynx: Oropharynx is clear.   Eyes:      Extraocular Movements: Extraocular movements intact.      Pupils: Pupils are equal, round, and reactive to light.   Cardiovascular:      Rate and Rhythm: Normal rate and regular rhythm.      Pulses: Normal pulses.      Heart sounds: Normal heart sounds.   Pulmonary:      Effort: Pulmonary effort is normal.      Breath sounds: Normal breath sounds.   Abdominal:      General: Abdomen is flat. Bowel sounds are normal.      Palpations: Abdomen is soft.      Tenderness: There is abdominal tenderness in the left lower quadrant. There is no left CVA tenderness.   Genitourinary:     Comments: Defers states that she just saw her OB/GYN.  Musculoskeletal:         General: Normal range of motion.      Cervical back: Normal range of motion and neck supple.   Skin:     General: Skin is warm and dry.      Capillary Refill: Capillary refill takes less than 2 seconds.   Neurological:      General: No focal deficit present.      Mental Status: She is alert and oriented to person, place, and time.   Psychiatric:         Mood and Affect: Mood normal.         Behavior: Behavior normal.         Judgment: Judgment normal.         Labs Reviewed   URINALYSIS WITH REFLEX MICROSCOPIC AND CULTURE - Abnormal       Result Value    Color, Urine Steph (*)     Appearance, Urine Hazy (*)     Specific Gravity, Urine 1.030      pH, Urine 5.0      Protein, Urine 30 (1+) (*)     Glucose, Urine NEGATIVE      Blood,  Urine NEGATIVE      Ketones, Urine 5 (TRACE) (*)     Bilirubin, Urine NEGATIVE      Urobilinogen, Urine <2.0      Nitrite, Urine NEGATIVE      Leukocyte Esterase, Urine NEGATIVE     CBC WITH AUTO DIFFERENTIAL - Abnormal    WBC 9.3      nRBC 0.0      RBC 4.91      Hemoglobin 13.0      Hematocrit 40.1      MCV 82      MCH 26.5      MCHC 32.4      RDW 14.6 (*)     Platelets 331      MPV 10.3      Neutrophils % 61.2      Immature Granulocytes %, Automated 0.2      Lymphocytes % 26.4      Monocytes % 10.1      Eosinophils % 1.8      Basophils % 0.3      Neutrophils Absolute 5.71      Immature Granulocytes Absolute, Automated 0.02      Lymphocytes Absolute 2.47      Monocytes Absolute 0.94      Eosinophils Absolute 0.17      Basophils Absolute 0.03     COMPREHENSIVE METABOLIC PANEL - Abnormal    Glucose 82      Sodium 136      Potassium 3.2 (*)     Chloride 103      Bicarbonate 26      Anion Gap 10      Urea Nitrogen 13      Creatinine 0.83      eGFR 90      Calcium 9.0      Albumin 3.8      Alkaline Phosphatase 88      Total Protein 6.8      AST 16      Bilirubin, Total 0.7      ALT 15     URINALYSIS MICROSCOPIC WITH REFLEX CULTURE - Abnormal    WBC, Urine 1-5      RBC, Urine NONE      Squamous Epithelial Cells, Urine 1-9 (SPARSE)      Bacteria, Urine 1+ (*)     Mucus, Urine 2+     HCG, URINE, QUALITATIVE - Normal    HCG, Urine NEGATIVE     LACTATE - Normal    Lactate 0.6      Narrative:     Venipuncture immediately after or during the administration of Metamizole may lead to falsely low results. Testing should be performed immediately  prior to Metamizole dosing.   LIPASE - Normal    Lipase 9      Narrative:     Venipuncture immediately after or during the administration of Metamizole may lead to falsely low results. Testing should be performed immediately prior to Metamizole dosing.   URINALYSIS WITH REFLEX MICROSCOPIC AND CULTURE    Narrative:     The following orders were created for panel order Urinalysis with  Reflex Microscopic and Culture.  Procedure                               Abnormality         Status                     ---------                               -----------         ------                     Urinalysis with Reflex M...[[id number]]  Abnormal            Final result               Extra Urine Gray Tube[[id number]]                                                         Please view results for these tests on the individual orders.   EXTRA URINE GRAY TUBE     Pain Management Panel           No data to display              US pelvis transvaginal   Final Result   Left ovary measures 6.5 x 5.5 x 5.1 cm in total. There is a left   ovarian hemorrhagic cyst measuring 5.4 x 5.5 x 4.7 cm, with a few   nonspecific small foci of calcification along its periphery.        Hysterectomy.        Normal appearance of the right ovary.        Trace volume of simple pelvic free fluid is probably physiologic.             MACRO:   None        Signed by: Basil Awan 10/31/2023 12:29 AM   Dictation workstation:   [id number]      CT abdomen pelvis w IV contrast   Final Result   6.5 cm x 4.7 cm cystic lesion in the left adnexa which is increased   in size in comparison to prior examination. There is evidence of   septation inferiorly and internal calcification. Edema and stranding   and small amount of free fluid in the pelvis. The finding may be   ovarian in etiology and ovarian cyst, endometrioma, cystic neoplasm   are differential considerations. An infectious process is also not   excluded. Pelvic ultrasound with Doppler is recommended for further   evaluation.        Left iliac vein stent.        Fluid-filled segments of colon compatible with diarrhea/colitis.        MACRO:   None        Signed by: Juan Morin 10/30/2023 10:49 PM   Dictation workstation:   [id number]          ED Course & MDM   Diagnoses as of 10/31/23 0040   Hemorrhagic cyst of left ovary       Medical Decision Making  On initial evaluation patient is  well-appearing in the emergency department at this time.  She does have some lower left lower quadrant discomfort no rebound or guarding noted at this point.  Urine shows ketones but no nitrates or leukocytes lactic is negative lipase negative CMP normal kidney functions CBC shows no leukocytosis or signs of anemia.  CT of the abdomen pelvis shows a cystic lesion of the left adnexa which is increased in size fluid-filled segments of colon compatible diarrhea/colitis.  Ultrasound was then performed shows a left ovary hemorrhagic cyst, hysterectomy normal appearance of the right ovary there is good blood flow to bilateral ovaries.  I sat down and discussed results in depth with patient and made her aware of her results.  States that she feels comfortable going home at this point with outpatient follow-up.  Educated her on strict return precautions.  I did refer her ultrasound to her OB/GYN Dr. Zamudio.  She has no further questions or concerns at this time will be discharged in stable condition.        Procedure  Procedures    I discussed the differential, results and discharge plan with the patient and/or family/friend/caregiver if present.  I emphasized the importance of follow-up with the physician I referred them to in the timeframe recommended.  I explained reasons for the patient to return to the Emergency Department. Additional verbal discharge instructions were also given and discussed with the patient to supplement those generated by the EMR. We also discussed medications that were prescribed (if any) including common side effects and interactions. The patient was advised to abstain from driving, operating heavy machinery or making significant decisions while taking medications such as opiates and muscle relaxers that may impair this. All questions were addressed.  They understand return precautions and discharge instructions. The patient and/or family/friend/caregiver expressed understanding.            Saira Velasquez, APRN-CNP  10/31/23 0053

## 2023-11-02 ENCOUNTER — APPOINTMENT (OUTPATIENT)
Dept: RADIOLOGY | Facility: HOSPITAL | Age: 43
End: 2023-11-02
Payer: COMMERCIAL

## 2023-11-08 ENCOUNTER — OFFICE VISIT (OUTPATIENT)
Dept: OBSTETRICS AND GYNECOLOGY | Facility: CLINIC | Age: 43
End: 2023-11-08
Payer: COMMERCIAL

## 2023-11-08 VITALS
HEIGHT: 65 IN | BODY MASS INDEX: 35.49 KG/M2 | DIASTOLIC BLOOD PRESSURE: 72 MMHG | SYSTOLIC BLOOD PRESSURE: 108 MMHG | WEIGHT: 213 LBS

## 2023-11-08 DIAGNOSIS — I26.99 PULMONARY EMBOLISM WITHOUT ACUTE COR PULMONALE, UNSPECIFIED CHRONICITY, UNSPECIFIED PULMONARY EMBOLISM TYPE (MULTI): ICD-10-CM

## 2023-11-08 DIAGNOSIS — R10.2 PELVIC PAIN: Primary | ICD-10-CM

## 2023-11-08 DIAGNOSIS — N83.202 HEMORRHAGIC CYST OF LEFT OVARY: ICD-10-CM

## 2023-11-08 PROCEDURE — 99214 OFFICE O/P EST MOD 30 MIN: CPT | Performed by: OBSTETRICS & GYNECOLOGY

## 2023-11-08 PROCEDURE — 1036F TOBACCO NON-USER: CPT | Performed by: OBSTETRICS & GYNECOLOGY

## 2023-11-08 NOTE — PROGRESS NOTES
Subjective   Patient ID: Michelle Gonzales is a 43 y.o. female who presents for Follow-up (Went to ED 10/30 for L side pain they found a ruptured cyst.).  HPI  43-year-old presents for follow-up from the ED for severe left pelvic pain and a hemorrhagic left ovarian cyst.  Patient was most recently seen in the ED on October 25, 2023.  Patient has had a history of 3 episodes similar to this in the past 6 months.  Patient develops severe left adnexal pain making work difficult lasting for a couple days.  Patient also with prolonged dull ache on the left side.  Patient with a prior ultrasound revealing a ruptured ovarian cyst, her ultrasound on October 30 revealed a 5.5 cm hemorrhagic left ovarian cyst.  Patient does have a history of a PE, and currently on Xarelto.  Patient is status post a prior hysterectomy.  Patient has used Tylenol with minimal relief from the pain.  Her cardiologist recommended against using ibuprofen.  Patient has had nausea associated with the pain.        Objective   Physical Exam  Constitutional:       Appearance: Normal appearance.   Neurological:      Mental Status: She is alert.   Psychiatric:         Mood and Affect: Mood normal.         Behavior: Behavior normal.         Assessment/Plan   Problem List Items Addressed This Visit             ICD-10-CM    Pulmonary embolism (CMS/HCC) I26.99    Hemorrhagic cyst of left ovary N83.202    Pelvic pain - Primary R10.2     --PELVIC PAIN: Patient with episode of pain in June, then recurrence in September, still a dull constant aching pain with occasional sharp pain.  Patient now presents with a recurrence of her severe pain on October 30 being seen in the ED.  ULTRASOUND (10/30/2023): Uterus absent; right ovary 2.1 x 1.7 x 1.9 cm; left ovary 6.5 x 5.5 x 5.1 cm with a probable hemorrhagic cyst 5.4 x 5.5 x 4.7 cm.  Severe pain lasted for approximately 5 days and now continues to be a dull ache on the left side.  Patient had minimal relief with the  Tylenol, and was recommended against NSAIDs by her cardiologist due to the Xarelto.  Discussed with the patient treatment options for her recurrent severe pain and hemorrhagic cyst.  We discussed observation with pain management, we discussed using Tylenol, narcotics, patient will again discuss with her cardiologist the option of NSAIDs.  We also discussed suppression of the ovaries with OCPs is not an option due to her prior PE.  We also discussed option of surgery including either a LSO or BSO, however discussed risk of stopping the Xarelto for 5 days and increased risk of recurrent VTE off the Xarelto and also secondary to pelvic surgery.  Patient will discuss these options with her cardiologist and will follow-up in 2 to 4 weeks to discuss further.  We also discussed removing 1 ovary and chance of recurrence of ovarian cyst on the other ovary, and discussed option of removing both ovaries with symptoms of menopause, and will be unable to use estrogen due to history of prior PE  --HEMORRHAGIC OVARIAN CYST: Noted on ultrasound, has been recurrence with 3 episodes over the past 6 months  --s/p HYSTERECTOMY: TLH with Dr Carney in 2021  --History of PE: Currently on Xarelto  --Normal mammogram September 2021    PLAN:  Discussed option of NSAIDs and risk of pelvic surgery with her cardiologist  Follow-up in 2 to 4 weeks to discuss options

## 2023-11-08 NOTE — ASSESSMENT & PLAN NOTE
--PELVIC PAIN: Patient with episode of pain in June, then recurrence in September, still a dull constant aching pain with occasional sharp pain.  Patient now presents with a recurrence of her severe pain on October 30 being seen in the ED.  ULTRASOUND (10/30/2023): Uterus absent; right ovary 2.1 x 1.7 x 1.9 cm; left ovary 6.5 x 5.5 x 5.1 cm with a probable hemorrhagic cyst 5.4 x 5.5 x 4.7 cm.  Severe pain lasted for approximately 5 days and now continues to be a dull ache on the left side.  Patient had minimal relief with the Tylenol, and was recommended against NSAIDs by her cardiologist due to the Xarelto.  Discussed with the patient treatment options for her recurrent severe pain and hemorrhagic cyst.  We discussed observation with pain management, we discussed using Tylenol, narcotics, patient will again discuss with her cardiologist the option of NSAIDs.  We also discussed suppression of the ovaries with OCPs is not an option due to her prior PE.  We also discussed option of surgery including either a LSO or BSO, however discussed risk of stopping the Xarelto for 5 days and increased risk of recurrent VTE off the Xarelto and also secondary to pelvic surgery.  Patient will discuss these options with her cardiologist and will follow-up in 2 to 4 weeks to discuss further.  We also discussed removing 1 ovary and chance of recurrence of ovarian cyst on the other ovary, and discussed option of removing both ovaries with symptoms of menopause, and will be unable to use estrogen due to history of prior PE  --HEMORRHAGIC OVARIAN CYST: Noted on ultrasound, has been recurrence with 3 episodes over the past 6 months  --s/p HYSTERECTOMY: TLH with Dr Carney in 2021  --History of PE: Currently on Xarelto  --Normal mammogram September 2021    PLAN:  Discussed option of NSAIDs and risk of pelvic surgery with her cardiologist  Follow-up in 2 to 4 weeks to discuss options

## 2023-11-10 ENCOUNTER — HOSPITAL ENCOUNTER (OUTPATIENT)
Dept: RADIOLOGY | Facility: HOSPITAL | Age: 43
Discharge: HOME | End: 2023-11-10
Payer: COMMERCIAL

## 2023-11-10 DIAGNOSIS — Z01.419 WELL WOMAN EXAM: ICD-10-CM

## 2023-11-10 PROCEDURE — 77067 SCR MAMMO BI INCL CAD: CPT

## 2023-11-10 PROCEDURE — 77063 BREAST TOMOSYNTHESIS BI: CPT | Performed by: RADIOLOGY

## 2023-11-10 PROCEDURE — 77067 SCR MAMMO BI INCL CAD: CPT | Performed by: RADIOLOGY

## 2023-11-20 ENCOUNTER — TELEPHONE (OUTPATIENT)
Dept: OBSTETRICS AND GYNECOLOGY | Facility: CLINIC | Age: 43
End: 2023-11-20
Payer: COMMERCIAL

## 2023-11-20 NOTE — TELEPHONE ENCOUNTER
Patient called to asking about the 3 options that were discussed at her last visit. She would like to know the answers prior to her scheduling the appt with Dr. Zamudio.     The first option, she said she called her cardiologist and receive information from Dr. Espinoza. Dr. Espinoza said it would be ok to take the ibuprofen if it was rarely used. The second option was to go on birth control to lessen the risk of hemorrhage from the ovarian cyst. Dr. Espinoza said the xarelto will help but increase her risk of further clots. The 3rd option was surgery, Dr. Espinoza said that would be better since it is a lower dose hormone post surgery. Michelle is asking if it is an option to be put on a low dose hormone instead of the surgery?

## 2024-01-05 DIAGNOSIS — E55.9 VITAMIN D DEFICIENCY: Primary | ICD-10-CM

## 2024-01-05 RX ORDER — ERGOCALCIFEROL 1.25 MG/1
1 CAPSULE ORAL
Qty: 12 CAPSULE | Refills: 3 | Status: SHIPPED | OUTPATIENT
Start: 2024-01-05 | End: 2024-01-10 | Stop reason: SDUPTHER

## 2024-01-07 ASSESSMENT — ENCOUNTER SYMPTOMS
CONSTIPATION: 1
MYALGIAS: 0
BELCHING: 0
VOMITING: 0
DIARRHEA: 1
ARTHRALGIAS: 0
ANOREXIA: 1
FREQUENCY: 0
ABDOMINAL PAIN: 1
HEMATOCHEZIA: 0
NAUSEA: 1
HEADACHES: 0
HEMATURIA: 0
FLATUS: 0
DYSURIA: 1
WEIGHT LOSS: 0
FEVER: 0

## 2024-01-10 ENCOUNTER — PATIENT MESSAGE (OUTPATIENT)
Dept: PRIMARY CARE | Facility: CLINIC | Age: 44
End: 2024-01-10
Payer: COMMERCIAL

## 2024-01-10 DIAGNOSIS — E55.9 VITAMIN D DEFICIENCY: ICD-10-CM

## 2024-01-10 RX ORDER — ERGOCALCIFEROL 1.25 MG/1
1 CAPSULE ORAL
Qty: 12 CAPSULE | Refills: 3 | Status: SHIPPED | OUTPATIENT
Start: 2024-01-10 | End: 2024-03-21 | Stop reason: SDUPTHER

## 2024-01-11 ENCOUNTER — OFFICE VISIT (OUTPATIENT)
Dept: OBSTETRICS AND GYNECOLOGY | Facility: CLINIC | Age: 44
End: 2024-01-11
Payer: COMMERCIAL

## 2024-01-11 ENCOUNTER — PREP FOR PROCEDURE (OUTPATIENT)
Dept: OBSTETRICS AND GYNECOLOGY | Facility: CLINIC | Age: 44
End: 2024-01-11

## 2024-01-11 VITALS — BODY MASS INDEX: 36.44 KG/M2 | WEIGHT: 219 LBS | SYSTOLIC BLOOD PRESSURE: 122 MMHG | DIASTOLIC BLOOD PRESSURE: 82 MMHG

## 2024-01-11 DIAGNOSIS — R10.2 PELVIC PAIN: ICD-10-CM

## 2024-01-11 DIAGNOSIS — I26.99 PULMONARY EMBOLISM WITHOUT ACUTE COR PULMONALE, UNSPECIFIED CHRONICITY, UNSPECIFIED PULMONARY EMBOLISM TYPE (MULTI): ICD-10-CM

## 2024-01-11 DIAGNOSIS — R10.2 PELVIC PAIN: Primary | ICD-10-CM

## 2024-01-11 DIAGNOSIS — N83.202 BILATERAL OVARIAN CYSTS: Primary | ICD-10-CM

## 2024-01-11 DIAGNOSIS — N83.209 HEMORRHAGIC OVARIAN CYST: ICD-10-CM

## 2024-01-11 DIAGNOSIS — N83.202 HEMORRHAGIC CYSTS OF BOTH OVARIES: ICD-10-CM

## 2024-01-11 DIAGNOSIS — N83.201 HEMORRHAGIC CYSTS OF BOTH OVARIES: ICD-10-CM

## 2024-01-11 DIAGNOSIS — N83.201 BILATERAL OVARIAN CYSTS: Primary | ICD-10-CM

## 2024-01-11 PROCEDURE — 1036F TOBACCO NON-USER: CPT | Performed by: OBSTETRICS & GYNECOLOGY

## 2024-01-11 PROCEDURE — 99214 OFFICE O/P EST MOD 30 MIN: CPT | Performed by: OBSTETRICS & GYNECOLOGY

## 2024-01-11 RX ORDER — ACETAMINOPHEN 325 MG/1
975 TABLET ORAL ONCE
Status: CANCELLED | OUTPATIENT
Start: 2024-01-11 | End: 2024-01-11

## 2024-01-11 RX ORDER — GABAPENTIN 600 MG/1
600 TABLET ORAL ONCE
Status: CANCELLED | OUTPATIENT
Start: 2024-01-11 | End: 2024-01-11

## 2024-01-11 RX ORDER — ENOXAPARIN SODIUM 100 MG/ML
40 INJECTION SUBCUTANEOUS ONCE
Status: CANCELLED | OUTPATIENT
Start: 2024-01-11

## 2024-01-11 NOTE — ASSESSMENT & PLAN NOTE
--PELVIC PAIN: Patient with recurrent episodes of pelvic pain.  Patient has had 4 episodes since June 2023.  Ultrasound has revealed recurrent hemorrhagic ovarian cyst.  Her episodes of pain are severe often limiting her to bed and causing her to miss work.  Activity becomes very difficult.  Patient's last episode was in December, had not November, and a severe episode in September and October.  Patient has had multiple ED visits and multiple ultrasounds.  Her pain usually lasts about 5 days and then becomes a dull ache.  Patient has used Tylenol, is unable to use NSAIDs due to her Xarelto.  Patient has had a prior hysterectomy due to severe menorrhagia on Xarelto.  Patient has discussed this on multiple occasions with myself and also with her cardiologist.  Patient is on Xarelto for prior history of a PE.  We have discussed options of continued observation and treating acute episodes with Tylenol.  Discussed use and risks of NSAIDs on her current Xarelto.  We discussed suppression of the ovaries with combined OCPs with increased risk of VTE, or use of progestin only pills.  Discussed protective effect of Xarelto when on hormonal treatment with OCPs or progestin only pills.  We also discussed surgery with oophorectomy, with resultant menopause.  Discussed potential symptoms of menopause and risk of use of ERT with prior history of PE.  Patient desires to proceed with a laparoscopy with bilateral salpingectomy.  --HEMORRHAGIC OVARIAN CYST: Noted on ultrasound, has been recurrence with 4 episodes over the past 6 months.  With severe pelvic pain causing her to miss work and limited activity.  --s/p HYSTERECTOMY: TLH with Dr Carney in 2021  --History of PE: In 2020 currently on Xarelto  --Normal mammogram September 2021    A laparoscopy with bilateral oophorectomy was discussed with the patient.  The procedure was discussed with possibly 3 or more ports being needed depending upon any pathology being identified and  treated.  The pre-op and post-op course was discussed.  Risks including anesthesia, infection, VTE, bleeding, and injury to the bowel, bladder or blood vessels with possible laparotomy was discussed.  Discussed history of prior PE, and will administer Lovenox preoperatively.  Patient will need to be off Xarelto prior and after the surgery.    PLAN:  Consent for laparoscopic bilateral oophorectomy  Will schedule laparoscopic bilateral oophorectomy  Will contact cardiology, and off Xarelto 3 to 5 days prior to surgery  Will use Lovenox preoperatively

## 2024-01-11 NOTE — PROGRESS NOTES
Answers submitted by the patient for this visit:  Abdominal Pain Questionnaire (Submitted on 1/7/2024)  Chief Complaint: Abdominal pain  Chronicity: recurrent  Onset: in the past 7 days  Onset quality: gradual  Frequency: daily  Episode duration: 2 Weeks  Progression since onset: gradually worsening  Pain location: LLQ, left flank  Pain - numeric: 7/10  Pain quality: cramping  Radiates to: back  anorexia: Yes  arthralgias: No  belching: No  constipation: Yes  diarrhea: Yes  dysuria: Yes  fever: No  flatus: No  frequency: No  headaches: No  hematochezia: No  hematuria: No  melena: No  myalgias: No  nausea: Yes  weight loss: No  vomiting: No  Aggravated by: bowel movement, certain positions, movement, urination  Relieved by: being still, recumbency

## 2024-01-11 NOTE — PROGRESS NOTES
Subjective   Patient ID: Michelle Gonzales is a 43 y.o. female who presents for Follow-up.    43-year-old with continued recurrent episodes of severe pelvic pain and hemorrhagic ovarian cyst.  Patient has had 4 similar episodes in the past 6 months.  Pain often severe for 5 days and then becomes a dull ache.  Frequently has caused her to miss work and limits her activity.  Discussed with the patient on multiple occasions, and patient is discussed with her cardiologist due to her Xarelto and history of PE.        Objective   Physical Exam  Constitutional:       Appearance: Normal appearance. She is well-developed.   Cardiovascular:      Rate and Rhythm: Normal rate and regular rhythm.   Pulmonary:      Effort: Pulmonary effort is normal.      Breath sounds: Normal breath sounds.   Abdominal:      Palpations: Abdomen is soft. There is no mass.      Tenderness: There is no abdominal tenderness.   Neurological:      Mental Status: She is alert.   Psychiatric:         Mood and Affect: Mood normal.         Behavior: Behavior normal.         Assessment/Plan   Problem List Items Addressed This Visit             ICD-10-CM    Pulmonary embolism (CMS/HCC) I26.99    Hemorrhagic cyst of left ovary N83.202    Pelvic pain - Primary R10.2     --PELVIC PAIN: Patient with recurrent episodes of pelvic pain.  Patient has had 4 episodes since June 2023.  Ultrasound has revealed recurrent hemorrhagic ovarian cyst.  Her episodes of pain are severe often limiting her to bed and causing her to miss work.  Activity becomes very difficult.  Patient's last episode was in December, had not November, and a severe episode in September and October.  Patient has had multiple ED visits and multiple ultrasounds.  Her pain usually lasts about 5 days and then becomes a dull ache.  Patient has used Tylenol, is unable to use NSAIDs due to her Xarelto.  Patient has had a prior hysterectomy due to severe menorrhagia on Xarelto.  Patient has discussed this on  multiple occasions with myself and also with her cardiologist.  Patient is on Xarelto for prior history of a PE.  We have discussed options of continued observation and treating acute episodes with Tylenol.  Discussed use and risks of NSAIDs on her current Xarelto.  We discussed suppression of the ovaries with combined OCPs with increased risk of VTE, or use of progestin only pills.  Discussed protective effect of Xarelto when on hormonal treatment with OCPs or progestin only pills.  We also discussed surgery with oophorectomy, with resultant menopause.  Discussed potential symptoms of menopause and risk of use of ERT with prior history of PE.  Patient desires to proceed with a laparoscopy with bilateral salpingectomy.  --HEMORRHAGIC OVARIAN CYST: Noted on ultrasound, has been recurrence with 4 episodes over the past 6 months.  With severe pelvic pain causing her to miss work and limited activity.  --s/p HYSTERECTOMY: [facility] with Dr Carney in 2021  --History of PE: In 2020 currently on Xarelto  --Normal mammogram September 2021    A laparoscopy with bilateral oophorectomy was discussed with the patient.  The procedure was discussed with possibly 3 or more ports being needed depending upon any pathology being identified and treated.  The pre-op and post-op course was discussed.  Risks including anesthesia, infection, VTE, bleeding, and injury to the bowel, bladder or blood vessels with possible laparotomy was discussed.  Discussed history of prior PE, and will administer Lovenox preoperatively.  Patient will need to be off Xarelto prior and after the surgery.    PLAN:  Consent for laparoscopic bilateral oophorectomy  Will schedule laparoscopic bilateral oophorectomy  Will contact cardiology, and off Xarelto 3 to 5 days prior to surgery  Will use Lovenox preoperatively

## 2024-01-16 ENCOUNTER — TELEPHONE (OUTPATIENT)
Dept: OBSTETRICS AND GYNECOLOGY | Facility: CLINIC | Age: 44
End: 2024-01-16
Payer: COMMERCIAL

## 2024-01-16 PROBLEM — N83.202 BILATERAL OVARIAN CYSTS: Status: ACTIVE | Noted: 2024-01-11

## 2024-01-16 PROBLEM — N83.209 HEMORRHAGIC OVARIAN CYST: Status: ACTIVE | Noted: 2024-01-11

## 2024-01-16 PROBLEM — N83.201 BILATERAL OVARIAN CYSTS: Status: ACTIVE | Noted: 2024-01-11

## 2024-01-16 NOTE — TELEPHONE ENCOUNTER
Pt has questions regarding the status of her surgery being scheduled. Her phone is also having issues, she would like to be reached by [employer] if you cannot get through on her phone.

## 2024-01-22 ENCOUNTER — ANESTHESIA EVENT (OUTPATIENT)
Dept: OPERATING ROOM | Facility: HOSPITAL | Age: 44
End: 2024-01-22
Payer: COMMERCIAL

## 2024-01-23 ENCOUNTER — HOSPITAL ENCOUNTER (OUTPATIENT)
Facility: HOSPITAL | Age: 44
Setting detail: OUTPATIENT SURGERY
Discharge: HOME | End: 2024-01-23
Attending: OBSTETRICS & GYNECOLOGY | Admitting: OBSTETRICS & GYNECOLOGY
Payer: COMMERCIAL

## 2024-01-23 ENCOUNTER — PHARMACY VISIT (OUTPATIENT)
Dept: PHARMACY | Facility: CLINIC | Age: 44
End: 2024-01-23
Payer: MEDICARE

## 2024-01-23 ENCOUNTER — ANESTHESIA (OUTPATIENT)
Dept: OPERATING ROOM | Facility: HOSPITAL | Age: 44
End: 2024-01-23
Payer: COMMERCIAL

## 2024-01-23 VITALS
OXYGEN SATURATION: 100 % | SYSTOLIC BLOOD PRESSURE: 110 MMHG | TEMPERATURE: 97.2 F | DIASTOLIC BLOOD PRESSURE: 75 MMHG | HEART RATE: 68 BPM | RESPIRATION RATE: 15 BRPM

## 2024-01-23 DIAGNOSIS — R10.2 PELVIC PAIN: ICD-10-CM

## 2024-01-23 DIAGNOSIS — N83.201 BILATERAL OVARIAN CYSTS: ICD-10-CM

## 2024-01-23 DIAGNOSIS — N83.202 BILATERAL OVARIAN CYSTS: ICD-10-CM

## 2024-01-23 DIAGNOSIS — N83.209 HEMORRHAGIC OVARIAN CYST: ICD-10-CM

## 2024-01-23 LAB — PREGNANCY TEST URINE, POC: NEGATIVE

## 2024-01-23 PROCEDURE — 3700000001 HC GENERAL ANESTHESIA TIME - INITIAL BASE CHARGE: Performed by: OBSTETRICS & GYNECOLOGY

## 2024-01-23 PROCEDURE — 2720000007 HC OR 272 NO HCPCS: Performed by: OBSTETRICS & GYNECOLOGY

## 2024-01-23 PROCEDURE — 2500000001 HC RX 250 WO HCPCS SELF ADMINISTERED DRUGS (ALT 637 FOR MEDICARE OP): Performed by: OBSTETRICS & GYNECOLOGY

## 2024-01-23 PROCEDURE — 88305 TISSUE EXAM BY PATHOLOGIST: CPT | Performed by: PATHOLOGY

## 2024-01-23 PROCEDURE — 7100000002 HC RECOVERY ROOM TIME - EACH INCREMENTAL 1 MINUTE: Performed by: OBSTETRICS & GYNECOLOGY

## 2024-01-23 PROCEDURE — 2500000005 HC RX 250 GENERAL PHARMACY W/O HCPCS: Performed by: NURSE ANESTHETIST, CERTIFIED REGISTERED

## 2024-01-23 PROCEDURE — 7100000001 HC RECOVERY ROOM TIME - INITIAL BASE CHARGE: Performed by: OBSTETRICS & GYNECOLOGY

## 2024-01-23 PROCEDURE — 2500000004 HC RX 250 GENERAL PHARMACY W/ HCPCS (ALT 636 FOR OP/ED): Performed by: NURSE ANESTHETIST, CERTIFIED REGISTERED

## 2024-01-23 PROCEDURE — 58661 LAPAROSCOPY REMOVE ADNEXA: CPT | Performed by: OBSTETRICS & GYNECOLOGY

## 2024-01-23 PROCEDURE — 2500000004 HC RX 250 GENERAL PHARMACY W/ HCPCS (ALT 636 FOR OP/ED): Performed by: ANESTHESIOLOGY

## 2024-01-23 PROCEDURE — 3700000002 HC GENERAL ANESTHESIA TIME - EACH INCREMENTAL 1 MINUTE: Performed by: OBSTETRICS & GYNECOLOGY

## 2024-01-23 PROCEDURE — 2500000004 HC RX 250 GENERAL PHARMACY W/ HCPCS (ALT 636 FOR OP/ED): Performed by: OBSTETRICS & GYNECOLOGY

## 2024-01-23 PROCEDURE — 7100000010 HC PHASE TWO TIME - EACH INCREMENTAL 1 MINUTE: Performed by: OBSTETRICS & GYNECOLOGY

## 2024-01-23 PROCEDURE — 3600000009 HC OR TIME - EACH INCREMENTAL 1 MINUTE - PROCEDURE LEVEL FOUR: Performed by: OBSTETRICS & GYNECOLOGY

## 2024-01-23 PROCEDURE — 7100000009 HC PHASE TWO TIME - INITIAL BASE CHARGE: Performed by: OBSTETRICS & GYNECOLOGY

## 2024-01-23 PROCEDURE — 88305 TISSUE EXAM BY PATHOLOGIST: CPT | Mod: TC,SUR,PORLAB,WESLAB | Performed by: OBSTETRICS & GYNECOLOGY

## 2024-01-23 PROCEDURE — RXMED WILLOW AMBULATORY MEDICATION CHARGE

## 2024-01-23 PROCEDURE — 3600000004 HC OR TIME - INITIAL BASE CHARGE - PROCEDURE LEVEL FOUR: Performed by: OBSTETRICS & GYNECOLOGY

## 2024-01-23 PROCEDURE — 81025 URINE PREGNANCY TEST: CPT | Performed by: ANESTHESIOLOGY

## 2024-01-23 RX ORDER — IBUPROFEN 600 MG/1
600 TABLET ORAL EVERY 6 HOURS
Qty: 50 TABLET | Refills: 1 | Status: SHIPPED | OUTPATIENT
Start: 2024-01-23 | End: 2024-03-21 | Stop reason: WASHOUT

## 2024-01-23 RX ORDER — ONDANSETRON HYDROCHLORIDE 2 MG/ML
4 INJECTION, SOLUTION INTRAVENOUS ONCE
Status: COMPLETED | OUTPATIENT
Start: 2024-01-23 | End: 2024-01-23

## 2024-01-23 RX ORDER — PROPOFOL 10 MG/ML
INJECTION, EMULSION INTRAVENOUS AS NEEDED
Status: DISCONTINUED | OUTPATIENT
Start: 2024-01-23 | End: 2024-01-23

## 2024-01-23 RX ORDER — DIPHENHYDRAMINE HYDROCHLORIDE 50 MG/ML
12.5 INJECTION INTRAMUSCULAR; INTRAVENOUS ONCE AS NEEDED
Status: DISCONTINUED | OUTPATIENT
Start: 2024-01-23 | End: 2024-01-23 | Stop reason: HOSPADM

## 2024-01-23 RX ORDER — ONDANSETRON HYDROCHLORIDE 2 MG/ML
4 INJECTION, SOLUTION INTRAVENOUS ONCE AS NEEDED
Status: DISCONTINUED | OUTPATIENT
Start: 2024-01-23 | End: 2024-01-23 | Stop reason: HOSPADM

## 2024-01-23 RX ORDER — GABAPENTIN 300 MG/1
600 CAPSULE ORAL ONCE
Status: COMPLETED | OUTPATIENT
Start: 2024-01-23 | End: 2024-01-23

## 2024-01-23 RX ORDER — MORPHINE SULFATE 2 MG/ML
2 INJECTION, SOLUTION INTRAMUSCULAR; INTRAVENOUS EVERY 5 MIN PRN
Status: DISCONTINUED | OUTPATIENT
Start: 2024-01-23 | End: 2024-01-23 | Stop reason: HOSPADM

## 2024-01-23 RX ORDER — SCOLOPAMINE TRANSDERMAL SYSTEM 1 MG/1
1 PATCH, EXTENDED RELEASE TRANSDERMAL
Status: DISCONTINUED | OUTPATIENT
Start: 2024-01-23 | End: 2024-01-23 | Stop reason: HOSPADM

## 2024-01-23 RX ORDER — LABETALOL HYDROCHLORIDE 5 MG/ML
5 INJECTION, SOLUTION INTRAVENOUS ONCE AS NEEDED
Status: DISCONTINUED | OUTPATIENT
Start: 2024-01-23 | End: 2024-01-23 | Stop reason: HOSPADM

## 2024-01-23 RX ORDER — ALBUTEROL SULFATE 0.83 MG/ML
2.5 SOLUTION RESPIRATORY (INHALATION) ONCE AS NEEDED
Status: DISCONTINUED | OUTPATIENT
Start: 2024-01-23 | End: 2024-01-23 | Stop reason: HOSPADM

## 2024-01-23 RX ORDER — TRAMADOL HYDROCHLORIDE 50 MG/1
50 TABLET ORAL EVERY 4 HOURS PRN
Qty: 30 TABLET | Refills: 0 | Status: SHIPPED | OUTPATIENT
Start: 2024-01-23 | End: 2024-02-05 | Stop reason: ALTCHOICE

## 2024-01-23 RX ORDER — ROCURONIUM BROMIDE 50 MG/5 ML
SYRINGE (ML) INTRAVENOUS AS NEEDED
Status: DISCONTINUED | OUTPATIENT
Start: 2024-01-23 | End: 2024-01-23

## 2024-01-23 RX ORDER — LIDOCAINE HYDROCHLORIDE 10 MG/ML
0.1 INJECTION, SOLUTION EPIDURAL; INFILTRATION; INTRACAUDAL; PERINEURAL ONCE
Status: DISCONTINUED | OUTPATIENT
Start: 2024-01-23 | End: 2024-01-23 | Stop reason: HOSPADM

## 2024-01-23 RX ORDER — DROPERIDOL 2.5 MG/ML
0.62 INJECTION, SOLUTION INTRAMUSCULAR; INTRAVENOUS ONCE AS NEEDED
Status: DISCONTINUED | OUTPATIENT
Start: 2024-01-23 | End: 2024-01-23 | Stop reason: HOSPADM

## 2024-01-23 RX ORDER — ACETAMINOPHEN 500 MG
1000 TABLET ORAL EVERY 6 HOURS
Qty: 100 TABLET | Refills: 0 | Status: SHIPPED | OUTPATIENT
Start: 2024-01-23

## 2024-01-23 RX ORDER — OXYCODONE AND ACETAMINOPHEN 5; 325 MG/1; MG/1
1 TABLET ORAL EVERY 4 HOURS PRN
Status: DISCONTINUED | OUTPATIENT
Start: 2024-01-23 | End: 2024-01-23 | Stop reason: HOSPADM

## 2024-01-23 RX ORDER — SODIUM CHLORIDE, SODIUM LACTATE, POTASSIUM CHLORIDE, CALCIUM CHLORIDE 600; 310; 30; 20 MG/100ML; MG/100ML; MG/100ML; MG/100ML
100 INJECTION, SOLUTION INTRAVENOUS CONTINUOUS
Status: DISCONTINUED | OUTPATIENT
Start: 2024-01-23 | End: 2024-01-23 | Stop reason: HOSPADM

## 2024-01-23 RX ORDER — HYDRALAZINE HYDROCHLORIDE 20 MG/ML
5 INJECTION INTRAMUSCULAR; INTRAVENOUS EVERY 30 MIN PRN
Status: DISCONTINUED | OUTPATIENT
Start: 2024-01-23 | End: 2024-01-23 | Stop reason: HOSPADM

## 2024-01-23 RX ORDER — ONDANSETRON HYDROCHLORIDE 2 MG/ML
INJECTION, SOLUTION INTRAVENOUS AS NEEDED
Status: DISCONTINUED | OUTPATIENT
Start: 2024-01-23 | End: 2024-01-23

## 2024-01-23 RX ORDER — KETOROLAC TROMETHAMINE 30 MG/ML
INJECTION, SOLUTION INTRAMUSCULAR; INTRAVENOUS AS NEEDED
Status: DISCONTINUED | OUTPATIENT
Start: 2024-01-23 | End: 2024-01-23

## 2024-01-23 RX ORDER — ENOXAPARIN SODIUM 100 MG/ML
40 INJECTION SUBCUTANEOUS ONCE
Status: COMPLETED | OUTPATIENT
Start: 2024-01-23 | End: 2024-01-23

## 2024-01-23 RX ORDER — LIDOCAINE HYDROCHLORIDE 20 MG/ML
INJECTION, SOLUTION INFILTRATION; PERINEURAL AS NEEDED
Status: DISCONTINUED | OUTPATIENT
Start: 2024-01-23 | End: 2024-01-23

## 2024-01-23 RX ORDER — ACETAMINOPHEN 325 MG/1
975 TABLET ORAL ONCE
Status: COMPLETED | OUTPATIENT
Start: 2024-01-23 | End: 2024-01-23

## 2024-01-23 RX ORDER — MEPERIDINE HYDROCHLORIDE 25 MG/ML
12.5 INJECTION INTRAMUSCULAR; INTRAVENOUS; SUBCUTANEOUS EVERY 10 MIN PRN
Status: DISCONTINUED | OUTPATIENT
Start: 2024-01-23 | End: 2024-01-23 | Stop reason: HOSPADM

## 2024-01-23 RX ORDER — MIDAZOLAM HYDROCHLORIDE 1 MG/ML
INJECTION, SOLUTION INTRAMUSCULAR; INTRAVENOUS AS NEEDED
Status: DISCONTINUED | OUTPATIENT
Start: 2024-01-23 | End: 2024-01-23

## 2024-01-23 RX ORDER — FAMOTIDINE 10 MG/ML
20 INJECTION INTRAVENOUS ONCE
Status: COMPLETED | OUTPATIENT
Start: 2024-01-23 | End: 2024-01-23

## 2024-01-23 RX ORDER — FENTANYL CITRATE 50 UG/ML
INJECTION, SOLUTION INTRAMUSCULAR; INTRAVENOUS AS NEEDED
Status: DISCONTINUED | OUTPATIENT
Start: 2024-01-23 | End: 2024-01-23

## 2024-01-23 RX ORDER — DEXAMETHASONE SODIUM PHOSPHATE 4 MG/ML
INJECTION, SOLUTION INTRA-ARTICULAR; INTRALESIONAL; INTRAMUSCULAR; INTRAVENOUS; SOFT TISSUE AS NEEDED
Status: DISCONTINUED | OUTPATIENT
Start: 2024-01-23 | End: 2024-01-23

## 2024-01-23 RX ADMIN — KETOROLAC TROMETHAMINE 30 MG: 30 INJECTION, SOLUTION INTRAMUSCULAR; INTRAVENOUS at 11:43

## 2024-01-23 RX ADMIN — SCOPOLAMINE 1 PATCH: 1.5 PATCH, EXTENDED RELEASE TRANSDERMAL at 09:27

## 2024-01-23 RX ADMIN — SUGAMMADEX 200 MG: 100 INJECTION, SOLUTION INTRAVENOUS at 11:48

## 2024-01-23 RX ADMIN — GABAPENTIN 600 MG: 300 CAPSULE ORAL at 09:30

## 2024-01-23 RX ADMIN — MIDAZOLAM HYDROCHLORIDE 2 MG: 1 INJECTION, SOLUTION INTRAMUSCULAR; INTRAVENOUS at 10:40

## 2024-01-23 RX ADMIN — ACETAMINOPHEN 975 MG: 325 TABLET ORAL at 09:30

## 2024-01-23 RX ADMIN — ENOXAPARIN SODIUM 40 MG: 40 INJECTION SUBCUTANEOUS at 09:32

## 2024-01-23 RX ADMIN — PROPOFOL 200 MG: 10 INJECTION, EMULSION INTRAVENOUS at 10:43

## 2024-01-23 RX ADMIN — FENTANYL CITRATE 100 MCG: 50 INJECTION, SOLUTION INTRAMUSCULAR; INTRAVENOUS at 10:43

## 2024-01-23 RX ADMIN — FAMOTIDINE 20 MG: 10 INJECTION, SOLUTION INTRAVENOUS at 09:30

## 2024-01-23 RX ADMIN — ONDANSETRON 4 MG: 2 INJECTION INTRAMUSCULAR; INTRAVENOUS at 11:42

## 2024-01-23 RX ADMIN — MORPHINE SULFATE 2 MG: 2 INJECTION, SOLUTION INTRAMUSCULAR; INTRAVENOUS at 12:53

## 2024-01-23 RX ADMIN — LIDOCAINE HYDROCHLORIDE 100 MG: 20 INJECTION, SOLUTION INFILTRATION; PERINEURAL at 10:43

## 2024-01-23 RX ADMIN — MORPHINE SULFATE 2 MG: 2 INJECTION, SOLUTION INTRAMUSCULAR; INTRAVENOUS at 12:35

## 2024-01-23 RX ADMIN — ONDANSETRON 4 MG: 2 INJECTION INTRAMUSCULAR; INTRAVENOUS at 09:31

## 2024-01-23 RX ADMIN — SODIUM CHLORIDE, POTASSIUM CHLORIDE, SODIUM LACTATE AND CALCIUM CHLORIDE 100 ML/HR: 600; 310; 30; 20 INJECTION, SOLUTION INTRAVENOUS at 09:33

## 2024-01-23 RX ADMIN — Medication 35 MG: at 10:43

## 2024-01-23 RX ADMIN — DEXAMETHASONE SODIUM PHOSPHATE 8 MG: 4 INJECTION, SOLUTION INTRAMUSCULAR; INTRAVENOUS at 10:43

## 2024-01-23 SDOH — HEALTH STABILITY: MENTAL HEALTH: CURRENT SMOKER: 0

## 2024-01-23 ASSESSMENT — PAIN SCALES - GENERAL
PAINLEVEL_OUTOF10: 4
PAIN_LEVEL: 4
PAINLEVEL_OUTOF10: 6

## 2024-01-23 ASSESSMENT — PAIN - FUNCTIONAL ASSESSMENT
PAIN_FUNCTIONAL_ASSESSMENT: 0-10
PAIN_FUNCTIONAL_ASSESSMENT: 0-10

## 2024-01-23 NOTE — ANESTHESIA PROCEDURE NOTES
Airway  Date/Time: 1/23/2024 10:45 AM  Urgency: elective    Airway not difficult    Staffing  Performed: CRNA   Authorized by: ARLENE Bhakta    Performed by: ARLENE Bhakta  Patient location during procedure: OR    Indications and Patient Condition  Indications for airway management: anesthesia  Spontaneous Ventilation: absent  Sedation level: deep  Preoxygenated: yes  Patient position: sniffing  Mask difficulty assessment: 1 - vent by mask  Planned trial extubation    Final Airway Details  Final airway type: endotracheal airway      Successful airway: ETT  Cuffed: yes   Successful intubation technique: video laryngoscopy  Facilitating devices/methods: intubating stylet  Blade: Vi  Blade size: #4  ETT size (mm): 7.0  Cormack-Lehane Classification: grade I - full view of glottis  Placement verified by: chest auscultation and capnometry   Cuff volume (mL): 7  Measured from: lips  Number of attempts at approach: 1  Ventilation between attempts: none  Number of other approaches attempted: 0    Additional Comments  Atraumatic intubation [id number] lips teeth intact

## 2024-01-23 NOTE — INTERVAL H&P NOTE
H&P reviewed. The patient was examined and there are no changes to the H&P.  Patient received preop Lovenox  Will proceed with laparoscopic bilateral oophorectomy

## 2024-01-23 NOTE — ANESTHESIA POSTPROCEDURE EVALUATION
Patient: Michelle Gonzales    Procedure Summary       Date: 01/23/24 Room / Location: [facility] 02 / Virtual [facility]    Anesthesia Start: 1040 Anesthesia Stop: 1204    Procedure: Laparoscopy bilateral oophorectomy (NEEDS  TO KEEP TIME DUE TO TRANSPORT) (Bilateral) Diagnosis:       Bilateral ovarian cysts      Hemorrhagic ovarian cyst      Pelvic pain      (Bilateral ovarian cysts [N83.201, N83.202])      (Hemorrhagic ovarian cyst [N83.209])      (Pelvic pain [R10.2])    Surgeons: Bartolo Zamudio MD Responsible Provider: ARLENE Bhakta    Anesthesia Type: general ASA Status: 2            Anesthesia Type: general    Vitals Value Taken Time   /84 01/23/24 1300   Temp 36.2 °C (97.2 °F) 01/23/24 1250   Pulse 64 01/23/24 1300   Resp 17 01/23/24 1300   SpO2 100 % 01/23/24 1300       Anesthesia Post Evaluation    Patient location during evaluation: PACU  Patient participation: complete - patient participated  Level of consciousness: awake  Pain score: 4  Pain management: adequate  Airway patency: patent  Cardiovascular status: acceptable  Respiratory status: acceptable  Hydration status: acceptable  Postoperative Nausea and Vomiting: none        There were no known notable events for this encounter.

## 2024-01-23 NOTE — OP NOTE
Laparoscopy bilateral oophorectomy (NEEDS  TO KEEP TIME DUE TO TRANSPORT) (B) Operative Note     Date: 2024  OR Location: [facility]    Name: Michelle Gonzales, : 1980, Age: 43 y.o., MRN: [id number], Sex: female    Diagnosis  Pre-op Diagnosis     * Bilateral ovarian cysts [N83.201, N83.202]     * Hemorrhagic ovarian cyst [N83.209]     * Pelvic pain [R10.2] Post-op Diagnosis     * Bilateral ovarian cysts [N83.201, N83.202]     * Hemorrhagic ovarian cyst [N83.209]     * Pelvic pain [R10.2]     Procedures  Laparoscopy with bilateral oophorectomy      Surgeons      * Bartolo Zamudio - Primary    Resident/Fellow/Other Assistant:  Surgeon(s) and Role:    Procedure Summary  Anesthesia: General  ASA: II  Anesthesia Staff: CRNA: HEATH Bhakta-CRNA  Estimated Blood Loss: 5 mL  Intra-op Medications: * No intraprocedure medications in log *           Anesthesia Record               Intraprocedure I/O Totals          Intake    lactated Ringer's infusion 1000.00 mL    Total Intake 1000 mL       Output    Urine 10 mL    Est. Blood Loss 5 mL    Total Output 15 mL       Net    Net Volume 985 mL          Specimen:   ID Type Source Tests Collected by Time   1 : BILATERAL OVARIES Tissue OVARY OOPHORECTOMY BLIATERAL SURGICAL PATHOLOGY EXAM Bartolo Zamudio MD 2024 1108        Staff:   Circulator: Jamilah Cruz RN  Scrub Person: Frances Deutsch RN         Drains and/or Catheters:   [REMOVED] NG/OG/Feeding Tube Center mouth (Removed)       [REMOVED] Urethral Catheter Non-latex (Removed)       Tourniquet Times:         Implants:     Findings: Right ovary appeared normal and was removed.  Large left ovarian mass adhesed to the pelvis, dissected off the lateral pelvic wall and excised.  Cyst ruptured intraoperatively and was a hemorrhagic cyst.    Indications: Michelle Gonzales is an 43 y.o. female who is having surgery for Bilateral ovarian cysts [N83.201, N83.202]  Hemorrhagic ovarian cyst  [N83.209]  Pelvic pain [R10.2].     The patient was seen in the preoperative area. The risks, benefits, complications, treatment options, non-operative alternatives, expected recovery and outcomes were discussed with the patient. The possibilities of reaction to medication, pulmonary aspiration, injury to surrounding structures, bleeding, recurrent infection, the need for additional procedures, failure to diagnose a condition, and creating a complication requiring transfusion or operation were discussed with the patient. The patient concurred with the proposed plan, giving informed consent.  The site of surgery was properly noted/marked if necessary per policy. The patient has been actively warmed in preoperative area. Preoperative antibiotics are not indicated. Venous thrombosis prophylaxis have been ordered including bilateral sequential compression devices and chemical prophylaxis    Procedure Details: Patient was brought to the operating room.  A safety huddle was performed.  Patient was placed under general endotracheal tube anesthesia. Patient was placed in the lithotomy position. She was prepped and draped in the usual fashion for both an abdominal and a vaginal procedure. A timeout was performed.  A ring forcep with a sponge was placed in the vagina and the bladder was emptied with a straight catheter.  Attention was then turned abdominally. An intraumbilical incision was cut sharply in the skin. A Veress needle was inserted into the abdominal cavity. Its position was confirmed with saline. CO2 was instilled into the abdominal cavity. The Veress needle was removed. Under direct vision a 5 mm trocar and sheath was inserted. The trocar was removed and the laparoscope was inserted. A 5 mm left lateral incision was cut sharply in the skin and through this a second trocar and sheath was inserted.  A 10 mm suprapubic incision was cut sharply and the skin and through this a third trocar and sheath were inserted.   The pelvis was inspected.  The right ovary appeared normal.  The left ovary was enlarged and a piece in the pelvic sidewall.  The right ovary was grasped and using the ligature device the double pelvic ligament was cauterized and incised.  The right ovary was removed through the suprapubic port.  Attention was then turned to the left adnexa where the infundibulopelvic ligament was ligated using the LigaSure device, then with sharp and blunt dissection the hemorrhagic cyst was dissected off the pelvic sidewall.  In the process the cyst ruptured noting bloody fluid.  After dissection and removal of the left ovary the ovary was inserted in a Endobag and removed through the suprapubic port.  Hemostasis was noted to be good.  The pelvis was irrigated with saline.  Narciso was applied to the raw surfaces in the pelvis.  All instruments were removed.  An 0 Vicryl was used to close the fascia of the suprapubic incision.  The incisions were repaired with 4-0 Vicryl. All counts were correct. Estimated blood loss was 5 ml.  Patient was taken to the recovery room in stable condition.  Complications:  None; patient tolerated the procedure well.    Disposition: PACU - hemodynamically stable.  Condition: stable               Bartolo Zamudio  Phone Number: [phone]

## 2024-01-23 NOTE — ANESTHESIA PREPROCEDURE EVALUATION
Patient: Michelle Gonzales    Procedure Information       Date/Time: 01/23/24 1030    Procedure: Laparoscopy bilateral salpingectomy- possible bilateral oophorectomy (NEEDS  TO KEEP TIME DUE TO TRANSPORT) (Bilateral)    Location: [facility] 02 / [facility]    Surgeons: Bartolo Zamudio MD            Relevant Problems   Anesthesia (within normal limits)      Cardiovascular (within normal limits)   (+) Pulmonary embolism (CMS/HCC)      Endocrine (within normal limits)      GI (within normal limits)      /Renal (within normal limits)      Neuro/Psych   (+) Anxiety disorder   (+) Moderate episode of recurrent major depressive disorder (CMS/HCC)      Pulmonary   (+) Pulmonary embolism (CMS/HCC)   (+) Severe obstructive sleep apnea      GI/Hepatic (within normal limits)      Hematology (within normal limits)      Musculoskeletal (within normal limits)      Eyes, Ears, Nose, and Throat (within normal limits)      Infectious Disease (within normal limits)       Clinical information reviewed:   Tobacco  Allergies  Meds   Med Hx  Surg Hx   Fam Hx  Soc Hx        NPO Detail:  No data recorded     Physical Exam    Airway  Mallampati: II  TM distance: >3 FB     Cardiovascular - normal exam     Dental - normal exam     Pulmonary - normal exam     Abdominal - normal exam             Anesthesia Plan    History of general anesthesia?: yes  History of complications of general anesthesia?: no    ASA 2     general     The patient is not a current smoker.    intravenous induction   Postoperative administration of opioids is intended.  Anesthetic plan and risks discussed with patient.  Use of blood products discussed with patient who.    Plan discussed with CRNA.

## 2024-01-30 LAB
LABORATORY COMMENT REPORT: NORMAL
PATH REPORT.FINAL DX SPEC: NORMAL
PATH REPORT.GROSS SPEC: NORMAL
PATH REPORT.RELEVANT HX SPEC: NORMAL
PATH REPORT.TOTAL CANCER: NORMAL

## 2024-02-02 ENCOUNTER — TELEPHONE (OUTPATIENT)
Dept: PRIMARY CARE | Facility: CLINIC | Age: 44
End: 2024-02-02
Payer: COMMERCIAL

## 2024-02-02 NOTE — TELEPHONE ENCOUNTER
Prior authorization request received via fax for [employer]    Form given to: PLACED IN YOUR BOX ON 2/2/2024

## 2024-02-05 ENCOUNTER — OFFICE VISIT (OUTPATIENT)
Dept: OBSTETRICS AND GYNECOLOGY | Facility: CLINIC | Age: 44
End: 2024-02-05
Payer: COMMERCIAL

## 2024-02-05 VITALS
WEIGHT: 215 LBS | DIASTOLIC BLOOD PRESSURE: 72 MMHG | HEIGHT: 65 IN | SYSTOLIC BLOOD PRESSURE: 112 MMHG | BODY MASS INDEX: 35.82 KG/M2

## 2024-02-05 DIAGNOSIS — N83.209 HEMORRHAGIC OVARIAN CYST: Primary | ICD-10-CM

## 2024-02-05 PROCEDURE — 1036F TOBACCO NON-USER: CPT | Performed by: OBSTETRICS & GYNECOLOGY

## 2024-02-05 PROCEDURE — 99024 POSTOP FOLLOW-UP VISIT: CPT | Performed by: OBSTETRICS & GYNECOLOGY

## 2024-02-05 RX ORDER — SOY ISOFLAVONE 40 MG
TABLET ORAL
COMMUNITY
Start: 2024-01-29

## 2024-02-05 NOTE — TELEPHONE ENCOUNTER
Patient hasn't filled rx since April 2023. Can you find out if she is still taking [employer]ur[employer]. If so, I will do the PA. Thanks,  Marlene Valderrama, DO

## 2024-02-05 NOTE — ASSESSMENT & PLAN NOTE
-- POSTOP: Patient with laparoscopic bilateral oophorectomy on 1/23/2024 with large hemorrhagic left ovarian cyst.  Pathology revealed endometriotic cyst.  Patient with moderate abdominal discomfort and occasional joint pain otherwise doing well.  Will continue off work for another 1 to 2 weeks as needed.  --ENDOMETRIOSIS: Noted on pathology from [facility] January 2024   --s/p HYSTERECTOMY: With [facility]  --History of PE: Currently on Xarelto  --Normal mammogram November 2023    PLAN:  Return to work in 1 to 2 weeks  Otherwise follow-up in 1 year for annual exam

## 2024-02-05 NOTE — PROGRESS NOTES
Subjective   Patient ID: Michelle Gonzales is a 43 y.o. female who presents for Post-op (C/o still being sore with bending, getting up and down. Coughing or sneezing is very painful. She wants to discuss work restrictions and needs a letter for work.).  HPI  Patient is doing well, continues with moderate diffuse abdominal discomfort, a 4/10 with occasional sharp pain.  Patient also with moderate hot flashes and feeling warm, started OTC isoflavones.  Patient is back on her Xarelto and tolerating well.  Patient was interested in starting work in 1 to 2 weeks.        Objective   Physical Exam  Constitutional:       Appearance: Normal appearance.   Abdominal:      Palpations: Abdomen is soft.      Tenderness: There is no abdominal tenderness.      Comments: Incisions dry and healing well   Neurological:      Mental Status: She is alert.   Psychiatric:         Mood and Affect: Mood normal.         Behavior: Behavior normal.         Assessment/Plan   Problem List Items Addressed This Visit             ICD-10-CM    Hemorrhagic ovarian cyst - Primary N83.209     -- POSTOP: Patient with laparoscopic bilateral oophorectomy on 1/23/2024 with large hemorrhagic left ovarian cyst.  Pathology revealed endometriotic cyst.  Patient with moderate abdominal discomfort and occasional joint pain otherwise doing well.  Will continue off work for another 1 to 2 weeks as needed.  --ENDOMETRIOSIS: Noted on pathology from [facility] January 2024   --s/p HYSTERECTOMY: With [facility]  --History of PE: Currently on Xarelto  --Normal mammogram November 2023    PLAN:  Return to work in 1 to 2 weeks  Otherwise follow-up in 1 year for annual exam

## 2024-02-05 NOTE — LETTER
February 5, 2024     Patient: Michelle Gonzales   YOB: 1980   Date of Visit: 2/5/2024       To Whom It May Concern:    It is my medical opinion that Michelle Gonzales should remain out of work until 2/13/24. She will be able to return with no restrictions unless otherwise notified .    If you have any questions or concerns, please don't hesitate to call.       Sincerely,            Bartolo Zamudio MD    CC: No Recipients

## 2024-02-09 ENCOUNTER — TELEPHONE (OUTPATIENT)
Dept: OBSTETRICS AND GYNECOLOGY | Facility: CLINIC | Age: 44
End: 2024-02-09
Payer: COMMERCIAL

## 2024-02-09 NOTE — LETTER
February 9, 2024     Patient: Michelle Gonzales   YOB: 1980   Date of Visit: 2/9/2024       To Whom It May Concern:    Michelle Gonzales is under the care of Dr. Bartolo Zamudio MD. This letter is to certify that she may return to work on 2/13/24 with some physical restrictions. She will be unable to lift more than 15 pounds, she will be restricted to minimal bending over, and she should be able to sit while she performs her work as needed . These restrictions will last until 2/26/24. Starting 2/27/24, Michelle will have no physical restrictions and will be able to perform all work duties unless otherwise notified.    If you have any questions or concerns, please don't hesitate to call.    Sincerely,    Dr. Bartolo Zamudio MD    CC: No Recipients

## 2024-03-21 ENCOUNTER — OFFICE VISIT (OUTPATIENT)
Dept: PRIMARY CARE | Facility: CLINIC | Age: 44
End: 2024-03-21
Payer: COMMERCIAL

## 2024-03-21 VITALS
SYSTOLIC BLOOD PRESSURE: 122 MMHG | OXYGEN SATURATION: 99 % | HEART RATE: 74 BPM | RESPIRATION RATE: 18 BRPM | TEMPERATURE: 96.8 F | WEIGHT: 220 LBS | DIASTOLIC BLOOD PRESSURE: 80 MMHG | BODY MASS INDEX: 36.65 KG/M2 | HEIGHT: 65 IN

## 2024-03-21 DIAGNOSIS — E55.9 VITAMIN D DEFICIENCY: ICD-10-CM

## 2024-03-21 DIAGNOSIS — G43.709 CHRONIC MIGRAINE WITHOUT AURA WITHOUT STATUS MIGRAINOSUS, NOT INTRACTABLE: ICD-10-CM

## 2024-03-21 DIAGNOSIS — J01.00 ACUTE NON-RECURRENT MAXILLARY SINUSITIS: Primary | ICD-10-CM

## 2024-03-21 DIAGNOSIS — F41.1 GENERALIZED ANXIETY DISORDER: ICD-10-CM

## 2024-03-21 DIAGNOSIS — J30.89 ENVIRONMENTAL AND SEASONAL ALLERGIES: ICD-10-CM

## 2024-03-21 DIAGNOSIS — F33.1 MODERATE EPISODE OF RECURRENT MAJOR DEPRESSIVE DISORDER (MULTI): ICD-10-CM

## 2024-03-21 PROCEDURE — 99214 OFFICE O/P EST MOD 30 MIN: CPT | Performed by: FAMILY MEDICINE

## 2024-03-21 PROCEDURE — 1036F TOBACCO NON-USER: CPT | Performed by: FAMILY MEDICINE

## 2024-03-21 RX ORDER — MONTELUKAST SODIUM 10 MG/1
TABLET ORAL
Qty: 90 TABLET | Refills: 3 | Status: SHIPPED | OUTPATIENT
Start: 2024-03-21

## 2024-03-21 RX ORDER — AMITRIPTYLINE HYDROCHLORIDE 10 MG/1
10 TABLET, FILM COATED ORAL NIGHTLY
Qty: 90 TABLET | Refills: 3 | Status: SHIPPED | OUTPATIENT
Start: 2024-03-21 | End: 2025-03-21

## 2024-03-21 RX ORDER — ERGOCALCIFEROL 1.25 MG/1
1 CAPSULE ORAL
Qty: 12 CAPSULE | Refills: 3 | Status: SHIPPED | OUTPATIENT
Start: 2024-03-21

## 2024-03-21 RX ORDER — ATENOLOL 50 MG/1
50 TABLET ORAL DAILY
Qty: 90 TABLET | Refills: 3 | Status: SHIPPED | OUTPATIENT
Start: 2024-03-21 | End: 2025-03-21

## 2024-03-21 RX ORDER — DULOXETIN HYDROCHLORIDE 60 MG/1
60 CAPSULE, DELAYED RELEASE ORAL DAILY
Qty: 90 CAPSULE | Refills: 3 | Status: SHIPPED | OUTPATIENT
Start: 2024-03-21 | End: 2025-03-21

## 2024-03-21 RX ORDER — DOXYCYCLINE 100 MG/1
100 CAPSULE ORAL 2 TIMES DAILY
Qty: 14 CAPSULE | Refills: 0 | Status: SHIPPED | OUTPATIENT
Start: 2024-03-21 | End: 2024-03-28

## 2024-03-21 ASSESSMENT — PATIENT HEALTH QUESTIONNAIRE - PHQ9
SUM OF ALL RESPONSES TO PHQ9 QUESTIONS 1 & 2: 2
10. IF YOU CHECKED OFF ANY PROBLEMS, HOW DIFFICULT HAVE THESE PROBLEMS MADE IT FOR YOU TO DO YOUR WORK, TAKE CARE OF THINGS AT HOME, OR GET ALONG WITH OTHER PEOPLE: VERY DIFFICULT
1. LITTLE INTEREST OR PLEASURE IN DOING THINGS: SEVERAL DAYS
2. FEELING DOWN, DEPRESSED OR HOPELESS: SEVERAL DAYS

## 2024-03-21 ASSESSMENT — PAIN SCALES - GENERAL: PAINLEVEL: 2

## 2024-03-21 NOTE — PROGRESS NOTES
Subjective   Patient ID: Michelle Gonzales is a 43 y.o. female who presents for Migraine, Depression, Anxiety, and Sinusitis.  She is concerned about sinus pressure and post-nasal drip x 3-4 weeks.     Her migraines are currently occurring 1-2 times per month since starting the amitriptyline.     Since her last visit, she had a hysterectomy with [facility] in January. She is unable to take HRT due to hx of blood clots. She is taking soy isoflavones to help with hot flashes.           Patient Care Team:  Marlene Valderrama DO as PCP - General  Marlene Valderrama DO as PCP - [facility]  Bartolo Zamudio MD as Consulting Physician (Obstetrics and Gynecology)    Current Outpatient Medications   Medication Sig Dispense Refill    acetaminophen (Tylenol) 500 mg tablet Take 2 tablets (1,000 mg) by mouth every 6 hours. Take 1 ibuprofen with 2 extra Tylenol every 6 hours for control of pain 100 tablet 0    Afrin No Drip,oxymetazolin, 0.05 % mist USE 2 SPRAYS IN EACH NOSTRIL 2 TIMES A DAY FOR 3 DAYS      cetirizine (ZyrTEC) 10 mg tablet Take 1 tablet (10 mg) by mouth once daily as needed.      diphenhydrAMINE (BENADryl) 12.5 mg chewable tablet Chew 1 tablet (12.5 mg) every 6 hours if needed for allergies.      ELDERBERRY FRUIT ORAL Take by mouth.      loratadine (Claritin Liqui-Gel) 10 mg capsule Take 1 capsule by mouth 1 (one) time each day.      MULTIVITAMIN COMBINATION NO.55 ORAL Take by mouth.      multivitamin tablet Take 1 tablet by mouth once daily.      rimegepant (NURTEC) 75 mg tablet,disintegrating Take 1 tablet (75 mg) by mouth every other day. For migraine prevention      rivaroxaban (Xarelto) 10 mg tablet Take 1 tablet (10 mg) by mouth once daily.      soy isoflavone (Soy Isoflavones) 40 mg tablet       amitriptyline (Elavil) 10 mg tablet Take 1 tablet (10 mg) by mouth once daily at bedtime. 90 tablet 3    atenolol (Tenormin) 50 mg tablet Take 1 tablet (50 mg) by mouth once daily. 90 tablet 3    doxycycline  "(Vibramycin) 100 mg capsule Take 1 capsule (100 mg) by mouth 2 times a day for 7 days. Take with at least 8 ounces (large glass) of water, do not lie down for 30 minutes after 14 capsule 0    DULoxetine (Cymbalta) 60 mg DR capsule Take 1 capsule (60 mg) by mouth once daily. 90 capsule 3    ergocalciferol (Vitamin D-2) 1.25 MG (61887 UT) capsule Take 1 capsule (1,250 mcg) by mouth 1 (one) time per week. 12 capsule 3    montelukast (Singulair) 10 mg tablet Take 1 tablet by mouth every day for allergies or asthma 90 tablet 3     No current facility-administered medications for this visit.       Objective     Visit Vitals  /80 (BP Location: Left arm, Patient Position: Sitting, BP Cuff Size: Adult)   Pulse 74   Temp 36 °C (96.8 °F) (Temporal)   Resp 18   Ht 1.651 m (5' 5\")   Wt 99.8 kg (220 lb)   LMP 10/08/2021   SpO2 99%   BMI 36.61 kg/m²   OB Status Hysterectomy   Smoking Status Never   BSA 2.14 m²        Constitutional: Well nourished, well developed, appears stated age  Eyes: no scleral icterus, no conjunctival injection  Ears: normal external auditory canal, no retraction or bulging of tympanic membranes  Neck: no thyromegaly  Cardiovascular: regular rate and rhythm, wearing compression stockings  Respiratory: normal respiratory effort, clear to auscultation bilaterally  Musculoskeletal: No gross deformities appreciated  Skin: Warm, dry. No rashes  Neurologic: Alert, CNs II-XII grossly intact..  Psych: Appropriate mood and affect.        Assessment/Plan   Problem List Items Addressed This Visit       Anxiety disorder (Chronic)     Controlled with Cymbalta and atenolol. Continue.         Relevant Medications    atenolol (Tenormin) 50 mg tablet    Environmental and seasonal allergies    Relevant Medications    montelukast (Singulair) 10 mg tablet    Migraines (Chronic)     Stable on amitriptyline and atenolol         Relevant Medications    amitriptyline (Elavil) 10 mg tablet    Moderate episode of recurrent " major depressive disorder (CMS/HCC) (Chronic)     Controlled on Cymbalta. Continue           Relevant Medications    DULoxetine (Cymbalta) 60 mg DR capsule     Other Visit Diagnoses       Acute non-recurrent maxillary sinusitis    -  Primary    Relevant Medications    doxycycline (Vibramycin) 100 mg capsule    Vitamin D deficiency        Relevant Medications    ergocalciferol (Vitamin D-2) 1.25 MG (26601 UT) capsule            Follow up 6 months.    Marlene Valderrama DO

## 2024-04-01 ENCOUNTER — PATIENT MESSAGE (OUTPATIENT)
Dept: PRIMARY CARE | Facility: CLINIC | Age: 44
End: 2024-04-01
Payer: COMMERCIAL

## 2024-04-01 DIAGNOSIS — R09.81 SINUS CONGESTION: Primary | ICD-10-CM

## 2024-06-17 ENCOUNTER — OFFICE VISIT (OUTPATIENT)
Dept: CARDIOLOGY | Facility: CLINIC | Age: 44
End: 2024-06-17
Payer: COMMERCIAL

## 2024-06-17 VITALS
DIASTOLIC BLOOD PRESSURE: 73 MMHG | HEIGHT: 65 IN | BODY MASS INDEX: 36.82 KG/M2 | SYSTOLIC BLOOD PRESSURE: 107 MMHG | HEART RATE: 60 BPM | WEIGHT: 221 LBS | OXYGEN SATURATION: 97 %

## 2024-06-17 DIAGNOSIS — I26.99 PULMONARY EMBOLISM WITHOUT ACUTE COR PULMONALE, UNSPECIFIED CHRONICITY, UNSPECIFIED PULMONARY EMBOLISM TYPE (MULTI): Primary | ICD-10-CM

## 2024-06-17 PROCEDURE — 99213 OFFICE O/P EST LOW 20 MIN: CPT | Performed by: INTERNAL MEDICINE

## 2024-06-17 PROCEDURE — 1036F TOBACCO NON-USER: CPT | Performed by: INTERNAL MEDICINE

## 2024-06-17 RX ORDER — BUTYROSPERMUM PARKII(SHEA BUTTER), SIMMONDSIA CHINENSIS (JOJOBA) SEED OIL, ALOE BARBADENSIS LEAF EXTRACT .01; 1; 3.5 G/100G; G/100G; G/100G
250 LIQUID TOPICAL 2 TIMES DAILY
COMMUNITY

## 2024-06-17 RX ORDER — AZELASTINE 1 MG/ML
2 SPRAY, METERED NASAL 2 TIMES DAILY
COMMUNITY
Start: 2024-06-03

## 2024-06-17 RX ORDER — FLUTICASONE PROPIONATE 50 MCG
2 SPRAY, SUSPENSION (ML) NASAL DAILY
COMMUNITY
Start: 2024-06-04

## 2024-06-17 ASSESSMENT — PAIN SCALES - GENERAL: PAINLEVEL: 0-NO PAIN

## 2024-06-17 NOTE — PROGRESS NOTES
History of Present Illness:  Michelle Gonzales is a/an 44 y.o. woman who follows up for minimally provoked VTE; on rivaroxaban 10 mg daily. Seen originally in 2020 for PE that occurred after having been really sedentary including a period of three days in bed with about 30 hours asleep. Subsequently had a drive to the [place] and developed right flank pain. Eventually diagnosed with PE.    Full hysterectomy in January. Had ovaries removed so has a lot of symptoms related      In 2018 had b/l leg swelling with sensitivity and warmth; admitted and treated for cellulitis. Post discharge had recurrent swelling each month. Eventually saw Dr. Hamm and got a left iliac vein stent.      Past Medical History:   Diagnosis Date    Abscess of eyelid right eye, unspecified eyelid 05/23/2022    Cellulitis of right eyelid    Acute maxillary sinusitis, unspecified 01/12/2022    Acute non-recurrent maxillary sinusitis    Acute upper respiratory infection, unspecified 12/29/2021    URI with cough and congestion    Allergic     Anxiety     Chronic embolism and thrombosis of unspecified deep veins of right lower extremity (Multi) 03/19/2020    Chronic deep vein thrombosis (DVT) of right lower extremity, unspecified vein    Depression     Encounter for screening for COVID-19 03/13/2021    Encounter for screening for COVID-19    Encounter for surveillance of contraceptive pills 08/03/2020    Encounter for birth control pills maintenance    Hypoactive sexual desire disorder 01/28/2020    Lack of libido    Localized edema 03/19/2020    Leg edema, right    Nausea with vomiting, unspecified 08/18/2021    Nausea, vomiting, and diarrhea    Other headache syndrome 12/29/2021    Other headache syndrome    Other hypersomnia 06/24/2021    Excessive daytime sleepiness    Other pulmonary embolism without acute cor pulmonale (Multi) 05/19/2021    Other acute pulmonary embolism without acute cor pulmonale    Other specified soft tissue  disorders 06/16/2020    Swelling of both lower extremities    Pain, unspecified 08/07/2020    Body aches    Pelvic and perineal pain 11/02/2021    Pelvic pain in female    Peptic ulceration     Personal history of cervical dysplasia     History of cervical dysplasia    Personal history of diseases of the skin and subcutaneous tissue 01/28/2020    History of female hirsutism    Personal history of other diseases of the respiratory system     History of allergic rhinitis    Personal history of other diseases of the respiratory system     History of sinusitis    Personal history of other diseases of the respiratory system     History of bronchitis    Personal history of other mental and behavioral disorders     History of depression    Personal history of other mental and behavioral disorders     History of anxiety    Personal history of other specified conditions     History of insomnia    Personal history of other specified conditions 12/13/2021    History of urinary urgency    Personal history of other specified conditions 01/08/2021    History of headache    Personal history of other specified conditions 03/13/2021    History of headache    Personal history of other specified conditions 08/07/2020    History of fatigue    Personal history of pneumonia (recurrent) 01/12/2022    History of pneumonia    Personal history of pneumonia (recurrent)     History of pneumonia    PONV (postoperative nausea and vomiting)     Urinary tract infection      Past Surgical History:   Procedure Laterality Date    CHOLECYSTECTOMY      ENDOMETRIAL ABLATION      HYSTERECTOMY      OTHER SURGICAL HISTORY  10/04/2019    Bunionectomy    OTHER SURGICAL HISTORY  10/04/2019    Myringotomy with tube placement    OTHER SURGICAL HISTORY  09/15/2020    Colonoscopy    OTHER SURGICAL HISTORY  09/15/2020    Esophagogastroduodenoscopy    OTHER SURGICAL HISTORY  09/15/2020    [place] tooth extraction    OTHER SURGICAL HISTORY  02/23/2022    Total  hysterectomy laparoscopic    OTHER SURGICAL HISTORY  2021    Ablation    OTHER SURGICAL HISTORY  2021    Bilateral salpingectomy    OTHER SURGICAL HISTORY  2021    Cholecystectomy    OTHER SURGICAL HISTORY  2019    Vascular surgical procedure    WISDOM TOOTH EXTRACTION       Social History     Tobacco Use    Smoking status: Never     Passive exposure: Past    Smokeless tobacco: Never    Tobacco comments:     I was exposed to heavy second hand smoke until about the age of 17 or 18, but I have never smoked.   Vaping Use    Vaping status: Never Used   Substance Use Topics    Alcohol use: Yes     Alcohol/week: 4.0 standard drinks of alcohol     Types: 1 Glasses of wine, 2 Shots of liquor, 1 Standard drinks or equivalent per week     Comment: I am not much of a drinker.  The above is an overestimation.    Drug use: Never     Family History   Problem Relation Name Age of Onset    Anxiety disorder Mother  - JHONATAN Farias     Depression Mother  - JHONATAN Farias     Hypertension Mother  - JHONATAN Farias     Lung cancer Mother  - LEORA. Antonia Farias     Cancer Mother  - JHONATAN Farias     Other (cardiac disorder) Father Tad Farias     Hearing loss Father Tad Farias     Heart disease Father Tad Farias     Other (substance abuse) Brother Magno Farias     Alcohol abuse Brother Magno Farias     Stroke Maternal Grandmother  - [facility]     Coronary artery disease Maternal Grandmother  - [facility]     Hypertension Maternal Grandmother  - [facility]     Diabetes Maternal Grandmother  - [facility]     Heart disease Maternal Grandfather LILY Brizuela      Current Outpatient Medications   Medication Sig Dispense Refill    acetaminophen (Tylenol) 500 mg tablet Take 2 tablets (1,000 mg) by mouth every 6 hours. Take 1 ibuprofen with 2 extra Tylenol every 6 hours for control of pain 100 tablet 0     "amitriptyline (Elavil) 10 mg tablet Take 1 tablet (10 mg) by mouth once daily at bedtime. 90 tablet 3    atenolol (Tenormin) 50 mg tablet Take 1 tablet (50 mg) by mouth once daily. 90 tablet 3    azelastine (Astelin) 137 mcg (0.1 %) nasal spray Administer 2 sprays into each nostril 2 times a day.      diphenhydrAMINE (BENADryl) 12.5 mg chewable tablet Chew 1 tablet (12.5 mg) every 6 hours if needed for allergies.      DULoxetine (Cymbalta) 60 mg DR capsule Take 1 capsule (60 mg) by mouth once daily. 90 capsule 3    ELDERBERRY FRUIT ORAL Take by mouth.      ergocalciferol (Vitamin D-2) 1.25 MG (47685 UT) capsule Take 1 capsule (1,250 mcg) by mouth 1 (one) time per week. 12 capsule 3    fluticasone (Flonase) 50 mcg/actuation nasal spray 2 sprays once daily. Shake liquid prior to use.      loratadine (Claritin Liqui-Gel) 10 mg capsule Take 1 capsule by mouth 1 (one) time each day.      montelukast (Singulair) 10 mg tablet Take 1 tablet by mouth every day for allergies or asthma 90 tablet 3    multivitamin tablet Take 1 tablet by mouth once daily.      rivaroxaban (Xarelto) 10 mg tablet Take 1 tablet (10 mg) by mouth once daily.      saccharomyces boulardii (Florastor) 250 mg capsule Take 1 capsule (250 mg) by mouth 2 times a day. TAKING PROBIOTIC      soy isoflavone (Soy Isoflavones) 40 mg tablet Take 1 tablet by mouth 2 times a day.      Afrin No Drip,oxymetazolin, 0.05 % mist USE 2 SPRAYS IN EACH NOSTRIL 2 TIMES A DAY FOR 3 DAYS      cetirizine (ZyrTEC) 10 mg tablet Take 1 tablet (10 mg) by mouth once daily as needed.       No current facility-administered medications for this visit.       Physical Examination:  Blood pressure 107/73, pulse 60, height 1.651 m (5' 5\"), weight 100 kg (221 lb), last menstrual period 10/08/2021, SpO2 97%.  No distress  No JVD or carotid bruits  Lungs clear bilaterally  Heart regular and without murmurs  Abdomen soft and non-tender  No leg swelling  Pulses intact    Pertinent " Labs:    Pertinent Imaging:    Diagnoses and all orders for this visit:  Pulmonary embolism without acute cor pulmonale, unspecified chronicity, unspecified pulmonary embolism type (Multi) (Primary)  -     rivaroxaban (Xarelto) 10 mg tablet; Take 1 tablet (10 mg) by mouth once daily.  Continue low dose Xarelto  Compression socks    Follow up in one year.    Trinh Espinoza MD, MS

## 2024-06-21 ENCOUNTER — HOSPITAL ENCOUNTER (OUTPATIENT)
Dept: RADIOLOGY | Facility: HOSPITAL | Age: 44
Discharge: HOME | End: 2024-06-21
Payer: COMMERCIAL

## 2024-06-21 DIAGNOSIS — J32.0 CHRONIC MAXILLARY SINUSITIS: ICD-10-CM

## 2024-06-21 PROCEDURE — 70486 CT MAXILLOFACIAL W/O DYE: CPT

## 2024-06-30 ENCOUNTER — PATIENT MESSAGE (OUTPATIENT)
Dept: PRIMARY CARE | Facility: CLINIC | Age: 44
End: 2024-06-30
Payer: COMMERCIAL

## 2024-10-17 ENCOUNTER — APPOINTMENT (OUTPATIENT)
Dept: PRIMARY CARE | Facility: CLINIC | Age: 44
End: 2024-10-17
Payer: COMMERCIAL

## 2024-10-17 VITALS
TEMPERATURE: 97.5 F | HEART RATE: 78 BPM | DIASTOLIC BLOOD PRESSURE: 74 MMHG | RESPIRATION RATE: 22 BRPM | HEIGHT: 65 IN | SYSTOLIC BLOOD PRESSURE: 128 MMHG | OXYGEN SATURATION: 99 % | BODY MASS INDEX: 36.82 KG/M2 | WEIGHT: 221 LBS

## 2024-10-17 DIAGNOSIS — F33.1 MODERATE EPISODE OF RECURRENT MAJOR DEPRESSIVE DISORDER: ICD-10-CM

## 2024-10-17 DIAGNOSIS — J30.89 ENVIRONMENTAL AND SEASONAL ALLERGIES: ICD-10-CM

## 2024-10-17 DIAGNOSIS — F41.1 GENERALIZED ANXIETY DISORDER: ICD-10-CM

## 2024-10-17 DIAGNOSIS — G43.709 CHRONIC MIGRAINE WITHOUT AURA WITHOUT STATUS MIGRAINOSUS, NOT INTRACTABLE: ICD-10-CM

## 2024-10-17 PROCEDURE — 3008F BODY MASS INDEX DOCD: CPT | Performed by: FAMILY MEDICINE

## 2024-10-17 PROCEDURE — 1036F TOBACCO NON-USER: CPT | Performed by: FAMILY MEDICINE

## 2024-10-17 PROCEDURE — 99214 OFFICE O/P EST MOD 30 MIN: CPT | Performed by: FAMILY MEDICINE

## 2024-10-17 RX ORDER — ATENOLOL 50 MG/1
50 TABLET ORAL DAILY
Qty: 90 TABLET | Refills: 3 | Status: SHIPPED | OUTPATIENT
Start: 2024-10-17 | End: 2025-10-17

## 2024-10-17 RX ORDER — DULOXETIN HYDROCHLORIDE 60 MG/1
60 CAPSULE, DELAYED RELEASE ORAL DAILY
Qty: 90 CAPSULE | Refills: 3 | Status: SHIPPED | OUTPATIENT
Start: 2024-10-17 | End: 2025-10-17

## 2024-10-17 RX ORDER — MONTELUKAST SODIUM 10 MG/1
TABLET ORAL
Qty: 90 TABLET | Refills: 3 | Status: SHIPPED | OUTPATIENT
Start: 2024-10-17

## 2024-10-17 RX ORDER — AMITRIPTYLINE HYDROCHLORIDE 25 MG/1
25 TABLET, FILM COATED ORAL NIGHTLY
Qty: 90 TABLET | Refills: 3 | Status: SHIPPED | OUTPATIENT
Start: 2024-10-17 | End: 2025-10-17

## 2024-10-17 RX ORDER — UBROGEPANT 50 MG/1
1 TABLET ORAL DAILY PRN
Qty: 14 TABLET | Refills: 5 | Status: SHIPPED | OUTPATIENT
Start: 2024-10-17

## 2024-10-17 RX ORDER — MINERAL OIL
180 ENEMA (ML) RECTAL DAILY
COMMUNITY

## 2024-10-17 ASSESSMENT — ENCOUNTER SYMPTOMS
LOSS OF SENSATION IN FEET: 0
DEPRESSION: 0

## 2024-10-17 ASSESSMENT — PAIN SCALES - GENERAL: PAINLEVEL_OUTOF10: 0-NO PAIN

## 2024-10-17 NOTE — ASSESSMENT & PLAN NOTE
Controlled with Cymbalta and atenolol. Continue.    Orders:    atenolol (Tenormin) 50 mg tablet; Take 1 tablet (50 mg) by mouth once daily.

## 2024-10-17 NOTE — PROGRESS NOTES
Subjective   Patient ID: Michelle Gonzales is a 44 y.o. female who presents for Depression and Migraine.  Migraines  She is currently having about 1 migraine per month. She will often wake up with the migraine and it will usually not fully resolve during the day.     Menopause  She has an appointment with gyn. She is s/p bilateral oophrectomy in January. She is on long-term anticoagulation for DVT.  She is not on HRT. She has been having hot flashes, mood swings, and weight gain.     She is concerned about runny nose, post-nasal drip, and her ears sound like she is in a tunnel. She is taking allegra, benadryl, and Singulair.         Patient Care Team:  Marlene Valderrama DO as PCP - General  Marlene Valderrama DO as PCP - Fallon YANIRA PCP  Sofy Monahan MD as Consulting Physician (Otolaryngology)  Magno Merida MD as Consulting Physician (Obstetrics and Gynecology)    Current Outpatient Medications   Medication Sig Dispense Refill    acetaminophen (Tylenol) 500 mg tablet Take 2 tablets (1,000 mg) by mouth every 6 hours. Take 1 ibuprofen with 2 extra Tylenol every 6 hours for control of pain 100 tablet 0    Afrin No Drip,oxymetazolin, 0.05 % mist USE 2 SPRAYS IN EACH NOSTRIL 2 TIMES A DAY FOR 3 DAYS      azelastine (Astelin) 137 mcg (0.1 %) nasal spray Administer 2 sprays into each nostril 2 times a day.      cetirizine (ZyrTEC) 10 mg tablet Take 1 tablet (10 mg) by mouth once daily as needed.      diphenhydrAMINE (BENADryl) 12.5 mg chewable tablet Chew 1 tablet (12.5 mg) every 6 hours if needed for allergies.      ELDERBERRY FRUIT ORAL Take by mouth.      ergocalciferol (Vitamin D-2) 1.25 MG (56598 UT) capsule Take 1 capsule (1,250 mcg) by mouth 1 (one) time per week. 12 capsule 3    fluticasone (Flonase) 50 mcg/actuation nasal spray 2 sprays once daily. Shake liquid prior to use.      multivitamin tablet Take 1 tablet by mouth once daily.      rivaroxaban (Xarelto) 10 mg tablet Take 1 tablet (10 mg) by mouth once  "daily. 30 tablet 11    saccharomyces boulardii (Florastor) 250 mg capsule Take 1 capsule (250 mg) by mouth 2 times a day. TAKING PROBIOTIC      soy isoflavone (Soy Isoflavones) 40 mg tablet Take 1 tablet by mouth 2 times a day.      amitriptyline (Elavil) 25 mg tablet Take 1 tablet (25 mg) by mouth once daily at bedtime. 90 tablet 3    atenolol (Tenormin) 50 mg tablet Take 1 tablet (50 mg) by mouth once daily. 90 tablet 3    DULoxetine (Cymbalta) 60 mg DR capsule Take 1 capsule (60 mg) by mouth once daily. 90 capsule 3    fexofenadine (Allegra) 180 mg tablet Take 1 tablet (180 mg) by mouth once daily.      montelukast (Singulair) 10 mg tablet Take 1 tablet by mouth every day for allergies or asthma 90 tablet 3    ubrogepant (Ubrelvy) 50 mg tablet Take 1 tablet (50 mg) by mouth once daily as needed (take for migraine). 14 tablet 5     No current facility-administered medications for this visit.       Objective     Visit Vitals  /74 (BP Location: Left arm, Patient Position: Sitting, BP Cuff Size: Adult long)   Pulse 78   Temp 36.4 °C (97.5 °F) (Temporal)   Resp 22   Ht 1.651 m (5' 5\")   Wt 100 kg (221 lb)   LMP 10/08/2021   SpO2 99%   BMI 36.78 kg/m²   OB Status Hysterectomy   Smoking Status Never   BSA 2.14 m²        Constitutional: Well nourished, well developed, appears stated age  Eyes: no scleral icterus, no conjunctival injection  Ears: normal external auditory canal, no retraction or bulging of tympanic membranes. Possible serous effusion.   Neck: no thyromegaly  Cardiovascular: regular rate and rhythm,   Respiratory: normal respiratory effort, clear to auscultation bilaterally  Musculoskeletal: No gross deformities appreciated  Skin: Warm, dry. No rashes  Neurologic: Alert, CNs II-XII grossly intact..  Psych: Appropriate mood and affect.        Assessment & Plan  Moderate episode of recurrent major depressive disorder  Controlled on Cymbalta though having fluctuations which is likely related to menopause " changes  The increase in amitriptyline for migraines may have a positive effect on mood as well.     Orders:    DULoxetine (Cymbalta) 60 mg DR capsule; Take 1 capsule (60 mg) by mouth once daily.    Generalized anxiety disorder  Controlled with Cymbalta and atenolol. Continue.    Orders:    atenolol (Tenormin) 50 mg tablet; Take 1 tablet (50 mg) by mouth once daily.    Chronic migraine without aura without status migrainosus, not intractable  Stable on amitriptyline and atenolol  Increase amitriptyline  Add Ubrelvy as abortive medication     Orders:    amitriptyline (Elavil) 25 mg tablet; Take 1 tablet (25 mg) by mouth once daily at bedtime.    ubrogepant (Ubrelvy) 50 mg tablet; Take 1 tablet (50 mg) by mouth once daily as needed (take for migraine).    Environmental and seasonal allergies  Recommended she add Sudafed for the serous effusion  Orders:    montelukast (Singulair) 10 mg tablet; Take 1 tablet by mouth every day for allergies or asthma       Follow up 6 months. Briefly discussed with patient that she may want to consider HRT. She is on long term anticoagulation which significantly decreased her risk of DVT on HRT.    Marlene Valderrama, DO

## 2024-10-17 NOTE — ASSESSMENT & PLAN NOTE
Stable on amitriptyline and atenolol  Increase amitriptyline  Add Ubrelvy as abortive medication     Orders:    amitriptyline (Elavil) 25 mg tablet; Take 1 tablet (25 mg) by mouth once daily at bedtime.    ubrogepant (Ubrelvy) 50 mg tablet; Take 1 tablet (50 mg) by mouth once daily as needed (take for migraine).

## 2024-10-17 NOTE — PATIENT INSTRUCTIONS
Thank you for choosing [facility] for your healthcare.   As always if you have any questions or concerns please do not hesitate to call our office at [phone] or through [employer].    Have a great day!  Marlene Valderrama, DO

## 2024-10-17 NOTE — ASSESSMENT & PLAN NOTE
Recommended she add Sudafed for the serous effusion  Orders:    montelukast (Singulair) 10 mg tablet; Take 1 tablet by mouth every day for allergies or asthma

## 2024-10-17 NOTE — ASSESSMENT & PLAN NOTE
Controlled on Cymbalta though having fluctuations which is likely related to menopause changes  The increase in amitriptyline for migraines may have a positive effect on mood as well.     Orders:    DULoxetine (Cymbalta) 60 mg DR capsule; Take 1 capsule (60 mg) by mouth once daily.

## 2024-10-23 ENCOUNTER — ANCILLARY PROCEDURE (OUTPATIENT)
Dept: URGENT CARE | Age: 44
End: 2024-10-23
Payer: COMMERCIAL

## 2024-10-23 ENCOUNTER — OFFICE VISIT (OUTPATIENT)
Dept: URGENT CARE | Age: 44
End: 2024-10-23
Payer: COMMERCIAL

## 2024-10-23 VITALS
DIASTOLIC BLOOD PRESSURE: 81 MMHG | RESPIRATION RATE: 17 BRPM | OXYGEN SATURATION: 98 % | SYSTOLIC BLOOD PRESSURE: 115 MMHG | HEART RATE: 66 BPM | TEMPERATURE: 96.4 F

## 2024-10-23 DIAGNOSIS — R05.1 ACUTE COUGH: ICD-10-CM

## 2024-10-23 DIAGNOSIS — J06.9 VIRAL UPPER RESPIRATORY TRACT INFECTION: Primary | ICD-10-CM

## 2024-10-23 PROCEDURE — 71046 X-RAY EXAM CHEST 2 VIEWS: CPT

## 2024-10-23 RX ORDER — ALBUTEROL SULFATE 90 UG/1
2 INHALANT RESPIRATORY (INHALATION) EVERY 4 HOURS PRN
Qty: 8 G | Refills: 0 | Status: SHIPPED | OUTPATIENT
Start: 2024-10-23 | End: 2024-10-23

## 2024-10-23 RX ORDER — PREDNISONE 20 MG/1
40 TABLET ORAL DAILY
Qty: 10 TABLET | Refills: 0 | Status: SHIPPED | OUTPATIENT
Start: 2024-10-23 | End: 2024-10-23

## 2024-10-23 RX ORDER — ALBUTEROL SULFATE 90 UG/1
2 INHALANT RESPIRATORY (INHALATION) EVERY 4 HOURS PRN
Qty: 8 G | Refills: 0 | Status: SHIPPED | OUTPATIENT
Start: 2024-10-23 | End: 2025-10-23

## 2024-10-23 RX ORDER — BENZONATATE 100 MG/1
100 CAPSULE ORAL 3 TIMES DAILY PRN
Qty: 42 CAPSULE | Refills: 0 | Status: SHIPPED | OUTPATIENT
Start: 2024-10-23 | End: 2024-11-22

## 2024-10-23 RX ORDER — BENZONATATE 100 MG/1
100 CAPSULE ORAL 3 TIMES DAILY PRN
Qty: 42 CAPSULE | Refills: 0 | Status: SHIPPED | OUTPATIENT
Start: 2024-10-23 | End: 2024-10-23

## 2024-10-23 RX ORDER — PREDNISONE 20 MG/1
40 TABLET ORAL DAILY
Qty: 10 TABLET | Refills: 0 | Status: SHIPPED | OUTPATIENT
Start: 2024-10-23 | End: 2024-10-28

## 2024-10-23 NOTE — PROGRESS NOTES
Subjective   Patient ID: Michelle Gonzales is a 44 y.o. female. They present today with a chief complaint of Cough (Dry cough, 5 days), Nasal Congestion, and Earache (Both ears, with fluid).    History of Present Illness  HPI a 44-year-old female arrives to the clinic with chief complaint of dry cough, nasal congestion, ear pain.  The patient reports having symptoms stated above over the last 5 days.  She has tried over-the-counter medications with minimal relief she reports that she does have a history of pneumonia however her symptoms are not reminiscent of it.  She is here for further evaluation and health maintenance.    Past Medical History  Allergies as of 10/23/2024 - Reviewed 10/23/2024   Allergen Reaction Noted    Latex Hives 03/07/2023    Sulfamethoxazole Other 01/23/2023       (Not in a hospital admission)       Past Medical History:   Diagnosis Date    Abscess of eyelid right eye, unspecified eyelid 05/23/2022    Cellulitis of right eyelid    Acute maxillary sinusitis, unspecified 01/12/2022    Acute non-recurrent maxillary sinusitis    Acute upper respiratory infection, unspecified 12/29/2021    URI with cough and congestion    Allergic     Anxiety     Chronic embolism and thrombosis of unspecified deep veins of right lower extremity (Multi) 03/19/2020    Chronic deep vein thrombosis (DVT) of right lower extremity, unspecified vein    Depression     Encounter for screening for COVID-19 03/13/2021    Encounter for screening for COVID-19    Encounter for surveillance of contraceptive pills 08/03/2020    Encounter for birth control pills maintenance    Hypoactive sexual desire disorder 01/28/2020    Lack of libido    Localized edema 03/19/2020    Leg edema, right    Nausea with vomiting, unspecified 08/18/2021    Nausea, vomiting, and diarrhea    Other headache syndrome 12/29/2021    Other headache syndrome    Other hypersomnia 06/24/2021    Excessive daytime sleepiness    Other pulmonary embolism without  acute cor pulmonale 05/19/2021    Other acute pulmonary embolism without acute cor pulmonale    Other specified soft tissue disorders 06/16/2020    Swelling of both lower extremities    Pain, unspecified 08/07/2020    Body aches    Pelvic and perineal pain 11/02/2021    Pelvic pain in female    Peptic ulceration     Personal history of cervical dysplasia     History of cervical dysplasia    Personal history of diseases of the skin and subcutaneous tissue 01/28/2020    History of female hirsutism    Personal history of other diseases of the respiratory system     History of allergic rhinitis    Personal history of other diseases of the respiratory system     History of sinusitis    Personal history of other diseases of the respiratory system     History of bronchitis    Personal history of other mental and behavioral disorders     History of depression    Personal history of other mental and behavioral disorders     History of anxiety    Personal history of other specified conditions     History of insomnia    Personal history of other specified conditions 12/13/2021    History of urinary urgency    Personal history of other specified conditions 01/08/2021    History of headache    Personal history of other specified conditions 03/13/2021    History of headache    Personal history of other specified conditions 08/07/2020    History of fatigue    Personal history of pneumonia (recurrent) 01/12/2022    History of pneumonia    Personal history of pneumonia (recurrent)     History of pneumonia    PONV (postoperative nausea and vomiting)     Urinary tract infection        Past Surgical History:   Procedure Laterality Date    BILATERAL OOPHORECTOMY Bilateral 01/23/2024    CHOLECYSTECTOMY      ENDOMETRIAL ABLATION      HYSTERECTOMY      OTHER SURGICAL HISTORY  10/04/2019    Bunionectomy    OTHER SURGICAL HISTORY  10/04/2019    Myringotomy with tube placement    OTHER SURGICAL HISTORY  09/15/2020    Colonoscopy    OTHER  SURGICAL HISTORY  09/15/2020    Esophagogastroduodenoscopy    OTHER SURGICAL HISTORY  09/15/2020    [place] tooth extraction    OTHER SURGICAL HISTORY  06/14/2021    Ablation    OTHER SURGICAL HISTORY  11/02/2021    Bilateral salpingectomy    OTHER SURGICAL HISTORY  11/19/2019    Vascular surgical procedure    WISDOM TOOTH EXTRACTION          reports that she has never smoked. She has been exposed to tobacco smoke. She has never used smokeless tobacco. She reports current alcohol use of about 4.0 standard drinks of alcohol per week. She reports that she does not use drugs.    Review of Systems  Review of Systems  Cough, nasal congestion, fatigue.    Objective    Vitals:    10/23/24 1811   BP: 115/81   Pulse: 66   Resp: 17   Temp: 35.8 °C (96.4 °F)   TempSrc: Temporal   SpO2: 98%     Patient's last menstrual period was 10/08/2021.    Physical Exam  Wheezes and crackles in the lower left and right lobes.  Diminished lung sounds in the left and right upper lobes.  No evidence of crackles or rhonchi.  Congestion with rhinorrhea noted.  Procedures    Point of Care Test & Imaging Results from this visit  No results found for this visit on 10/23/24.   XR chest 2 views    Result Date: 10/23/2024  Interpreted By:  Ramón Palacios, STUDY: XR CHEST 2 VIEWS;  10/23/2024 6:31 pm   INDICATION: Signs/Symptoms:cough.   ,R05.1 Acute cough   COMPARISON: None.   ACCESSION NUMBER(S): [id number]   ORDERING CLINICIAN: AMBROCIO GARCIA   FINDINGS: PA and lateral radiographs of the chest were provided.       CARDIOMEDIASTINAL SILHOUETTE: Cardiomediastinal silhouette is normal in size and configuration.   LUNGS: Lungs are clear.   ABDOMEN: No remarkable upper abdominal findings.   BONES: No acute osseous changes.       1.  No evidence of acute cardiopulmonary process.       MACRO: None   Signed by: Ramón Palacios 10/23/2024 6:53 PM Dictation workstation:   [id number]     Diagnostic study results (if any) were reviewed by Ambrocio Garcia,  APRN-CNP.    Assessment/Plan   Allergies, medications, history, and pertinent labs/EKGs/Imaging reviewed by JIM Vera.     Medical Decision Making  S/s of uri/bronchitis    2 view chest x-ray completed in the office today was negative for any cardiopulmonary processes.  I have sent over an albuterol inhaler, prednisone, and Tessalon Perles.  For any worsening signs and symptoms, head to the emergency department.  Follow-up with your primary care provider.    As a result of the work-up, the patient was discharged home.  she was informed of her diagnosis and instructed to come back with any concerns or worsening of condition.  she and was agreeable to the plan as discussed above.  she was given the opportunity to ask questions.  All of the patient's questions were answered.    This document was generated using the assistance of voice recognition software. If there are any errors of spelling, grammar, syntax, or meaning; please feel free to contact me directly for clarification.     Orders and Diagnoses  Diagnoses and all orders for this visit:  Viral upper respiratory tract infection  -     albuterol (Ventolin HFA) 90 mcg/actuation inhaler; Inhale 2 puffs every 4 hours if needed for wheezing or shortness of breath.  -     benzonatate (Tessalon) 100 mg capsule; Take 1 capsule (100 mg) by mouth 3 times a day as needed for cough. Do not crush or chew.  -     predniSONE (Deltasone) 20 mg tablet; Take 2 tablets (40 mg) by mouth once daily for 5 days.  Acute cough  -     XR chest 2 views; Future  -     albuterol (Ventolin HFA) 90 mcg/actuation inhaler; Inhale 2 puffs every 4 hours if needed for wheezing or shortness of breath.  -     benzonatate (Tessalon) 100 mg capsule; Take 1 capsule (100 mg) by mouth 3 times a day as needed for cough. Do not crush or chew.  -     predniSONE (Deltasone) 20 mg tablet; Take 2 tablets (40 mg) by mouth once daily for 5 days.      Medical Admin Record      Patient  disposition: Home    Electronically signed by JIM Vera  7:03 PM

## 2024-10-30 ENCOUNTER — APPOINTMENT (OUTPATIENT)
Dept: OBSTETRICS AND GYNECOLOGY | Facility: CLINIC | Age: 44
End: 2024-10-30
Payer: COMMERCIAL

## 2024-11-06 ENCOUNTER — APPOINTMENT (OUTPATIENT)
Dept: OBSTETRICS AND GYNECOLOGY | Facility: CLINIC | Age: 44
End: 2024-11-06
Payer: COMMERCIAL

## 2024-11-06 VITALS
DIASTOLIC BLOOD PRESSURE: 64 MMHG | WEIGHT: 224.8 LBS | HEIGHT: 65 IN | SYSTOLIC BLOOD PRESSURE: 92 MMHG | BODY MASS INDEX: 37.45 KG/M2

## 2024-11-06 DIAGNOSIS — Z01.419 WELL WOMAN EXAM: Primary | ICD-10-CM

## 2024-11-06 DIAGNOSIS — R23.2 HOT FLASHES: ICD-10-CM

## 2024-11-06 DIAGNOSIS — N95.1 HOT FLASHES DUE TO MENOPAUSE: ICD-10-CM

## 2024-11-06 DIAGNOSIS — Z12.31 ENCOUNTER FOR SCREENING MAMMOGRAM FOR MALIGNANT NEOPLASM OF BREAST: ICD-10-CM

## 2024-11-06 PROCEDURE — 99396 PREV VISIT EST AGE 40-64: CPT | Performed by: STUDENT IN AN ORGANIZED HEALTH CARE EDUCATION/TRAINING PROGRAM

## 2024-11-06 PROCEDURE — 99213 OFFICE O/P EST LOW 20 MIN: CPT | Performed by: STUDENT IN AN ORGANIZED HEALTH CARE EDUCATION/TRAINING PROGRAM

## 2024-11-06 PROCEDURE — 3008F BODY MASS INDEX DOCD: CPT | Performed by: STUDENT IN AN ORGANIZED HEALTH CARE EDUCATION/TRAINING PROGRAM

## 2024-11-06 NOTE — PROGRESS NOTES
Subjective   Patient ID: Michelle Gonzales is a 44 y.o. year old female patient presenting for   Chief Complaint   Patient presents with    est patient well woman    Annual Exam     Menopause questions  Hot flashes daily, sweet cravings, frequent urination, weight gain, lorenzo   Hx of hysterectomy 1-2024   .  Patient presents for well woman exam.  Sexual History: She has been  for 2 years. Not having intercourse.  Menstrual History: Menopausal after hysterectomy.  She is having weight gain, daily hot flashes (5-10 per day). She is taking soy isoflavin to help.  Pregnancy History: Never   Occupation: She left her job recently and is job hunting.    Patient has hx of PE in 2021, she is on Xaralto.    No family hx of: breast, ovarian cancer.  No unexplained weight loss. No weight gain. No fatigue. No fevers or chills. No night sweats. No vision changes. No difficulty swallowing. No dyspnea, chest pain, or orthopnea. No breast masses or nipple discharge. No nausea/vomiting. No diarrhea or constipation. No blood in stool. No burning urination or hesitancy. No hematuria. No dyspareunia or vaginal dryness. No vaginal bleeding. No numbness or tingling of the extremities. No hair loss, skin growths, or rashes/bruising.          Review of Systems   All other systems reviewed and are negative.        Past Medical History:   Diagnosis Date    Abscess of eyelid right eye, unspecified eyelid 05/23/2022    Cellulitis of right eyelid    Acute maxillary sinusitis, unspecified 01/12/2022    Acute non-recurrent maxillary sinusitis    Acute upper respiratory infection, unspecified 12/29/2021    URI with cough and congestion    Allergic     Anxiety     Chronic embolism and thrombosis of unspecified deep veins of right lower extremity (Multi) 03/19/2020    Chronic deep vein thrombosis (DVT) of right lower extremity, unspecified vein    Depression     Encounter for screening for COVID-19 03/13/2021    Encounter for screening for  COVID-19    Encounter for surveillance of contraceptive pills 08/03/2020    Encounter for birth control pills maintenance    Hypoactive sexual desire disorder 01/28/2020    Lack of libido    Localized edema 03/19/2020    Leg edema, right    Nausea with vomiting, unspecified 08/18/2021    Nausea, vomiting, and diarrhea    Other headache syndrome 12/29/2021    Other headache syndrome    Other hypersomnia 06/24/2021    Excessive daytime sleepiness    Other pulmonary embolism without acute cor pulmonale 05/19/2021    Other acute pulmonary embolism without acute cor pulmonale    Other specified soft tissue disorders 06/16/2020    Swelling of both lower extremities    Pain, unspecified 08/07/2020    Body aches    Pelvic and perineal pain 11/02/2021    Pelvic pain in female    Peptic ulceration     Personal history of cervical dysplasia     History of cervical dysplasia    Personal history of diseases of the skin and subcutaneous tissue 01/28/2020    History of female hirsutism    Personal history of other diseases of the respiratory system     History of allergic rhinitis    Personal history of other diseases of the respiratory system     History of sinusitis    Personal history of other diseases of the respiratory system     History of bronchitis    Personal history of other mental and behavioral disorders     History of depression    Personal history of other mental and behavioral disorders     History of anxiety    Personal history of other specified conditions     History of insomnia    Personal history of other specified conditions 12/13/2021    History of urinary urgency    Personal history of other specified conditions 01/08/2021    History of headache    Personal history of other specified conditions 03/13/2021    History of headache    Personal history of other specified conditions 08/07/2020    History of fatigue    Personal history of pneumonia (recurrent) 01/12/2022    History of pneumonia    Personal history  of pneumonia (recurrent)     History of pneumonia    PONV (postoperative nausea and vomiting)     Urinary tract infection        Past Surgical History:   Procedure Laterality Date    BILATERAL OOPHORECTOMY Bilateral 01/23/2024    CHOLECYSTECTOMY      ENDOMETRIAL ABLATION      HYSTERECTOMY      OTHER SURGICAL HISTORY  10/04/2019    Bunionectomy    OTHER SURGICAL HISTORY  10/04/2019    Myringotomy with tube placement    OTHER SURGICAL HISTORY  09/15/2020    Colonoscopy    OTHER SURGICAL HISTORY  09/15/2020    Esophagogastroduodenoscopy    OTHER SURGICAL HISTORY  09/15/2020    [facility] tooth extraction    OTHER SURGICAL HISTORY  06/14/2021    Ablation    OTHER SURGICAL HISTORY  11/02/2021    Bilateral salpingectomy    OTHER SURGICAL HISTORY  11/19/2019    Vascular surgical procedure    WISDOM TOOTH EXTRACTION         Social History     Socioeconomic History    Marital status:      Spouse name: Not on file    Number of children: Not on file    Years of education: Not on file    Highest education level: Not on file   Occupational History    Occupation: [employer] services   Tobacco Use    Smoking status: Never     Passive exposure: Past    Smokeless tobacco: Never    Tobacco comments:     I was exposed to heavy second hand smoke until about the age of 17 or 18, but I have never smoked.   Vaping Use    Vaping status: Never Used   Substance and Sexual Activity    Alcohol use: Yes     Alcohol/week: 4.0 standard drinks of alcohol     Types: 1 Glasses of wine, 2 Shots of liquor, 1 Standard drinks or equivalent per week     Comment: I am not much of a drinker.  The above is an overestimation.    Drug use: Never    Sexual activity: Not Currently     Partners: Male     Birth control/protection: Surgical     Comment: I have had a hysterectomy & I have had the same partner.   Other Topics Concern    Not on file   Social History Narrative    Not on file     Social Drivers of Health     Financial Resource Strain: Not  on file   Food Insecurity: No Food Insecurity (9/7/2023)    Hunger Vital Sign     Worried About Running Out of Food in the Last Year: Never true     Ran Out of Food in the Last Year: Never true   Transportation Needs: Not on file   Physical Activity: Not on file   Stress: Not on file   Social Connections: Not on file   Intimate Partner Violence: Not on file   Housing Stability: Not on file           Objective   Physical Exam  General: A&Ox3  Head: Normocephalic, atraumatic  Heart/Lungs: RRR, No murmurs, gallops, or rubs. Lungs are CTAB.  Breast: Normal in appearance bilaterally. No skin changes. No rashes or bruises. No palpable masses.  Abdomen: Soft, nontender. BS+4. No bruising or masses.  Genitourinary: Labia and vagina normal in appearance. Uterus is surgically absent. No adnexal masses palpated.  Lower Extremities: No lower extremity Edema no palpable cords.     Assessment/Plan   Problem List Items Addressed This Visit       Hot flashes    Overview     Patient has hx of PE on Xaralto. She has extreme hot flashes that are prohibitive to her daily activities.  She would like to do something about them. Given her high risk of MI, stroke, DVT, and PE, estrogen is contraindicated.  She has had no improvement with SSRIs.  Plan for [place].  CMP ordered.         Well woman exam - Primary    Overview     laparoscopy with bilateral oophorectomy on 1/23/2024 follow-up.  2/5/2024  --ENDOMETRIOSIS: Noted on pathology from [facility] January 2024   --MENOPAUSE: Patient using OTC isoflavones with moderate hot flashes, tolerating well.  --s/p HYSTERECTOMY: With [facility]  --History of PE: Currently on Xarelto  --Normal mammogram November 2023         Relevant Orders    Comprehensive Metabolic Panel    Vitamin D 25-Hydroxy,Total (for eval of Vitamin D levels)    TSH with reflex to Free T4 if abnormal     Other Visit Diagnoses       Encounter for screening mammogram for malignant neoplasm of breast        Relevant Orders    BI mammo  bilateral screening tomosynthesis    Hot flashes due to menopause        Relevant Medications    fezolinetant 45 mg tablet                   Magno Merida MD 04/20/24 3:41 PM

## 2024-11-12 ENCOUNTER — TELEPHONE (OUTPATIENT)
Dept: OBSTETRICS AND GYNECOLOGY | Facility: CLINIC | Age: 44
End: 2024-11-12
Payer: COMMERCIAL

## 2024-11-14 PROBLEM — R23.2 HOT FLASHES: Status: ACTIVE | Noted: 2024-11-14

## 2024-11-20 ENCOUNTER — HOSPITAL ENCOUNTER (OUTPATIENT)
Dept: RADIOLOGY | Facility: HOSPITAL | Age: 44
Discharge: HOME | End: 2024-11-20
Payer: COMMERCIAL

## 2024-11-20 ENCOUNTER — LAB (OUTPATIENT)
Dept: LAB | Facility: LAB | Age: 44
End: 2024-11-20
Payer: COMMERCIAL

## 2024-11-20 VITALS — WEIGHT: 224 LBS | HEIGHT: 65 IN | BODY MASS INDEX: 37.32 KG/M2

## 2024-11-20 DIAGNOSIS — Z01.419 WELL WOMAN EXAM: ICD-10-CM

## 2024-11-20 DIAGNOSIS — Z12.31 ENCOUNTER FOR SCREENING MAMMOGRAM FOR MALIGNANT NEOPLASM OF BREAST: ICD-10-CM

## 2024-11-20 LAB
25(OH)D3 SERPL-MCNC: 47 NG/ML (ref 30–100)
ALBUMIN SERPL BCP-MCNC: 3.8 G/DL (ref 3.4–5)
ALP SERPL-CCNC: 119 U/L (ref 33–110)
ALT SERPL W P-5'-P-CCNC: 18 U/L (ref 7–45)
ANION GAP SERPL CALC-SCNC: 13 MMOL/L (ref 10–20)
AST SERPL W P-5'-P-CCNC: 16 U/L (ref 9–39)
BILIRUB SERPL-MCNC: 0.4 MG/DL (ref 0–1.2)
BUN SERPL-MCNC: 14 MG/DL (ref 6–23)
CALCIUM SERPL-MCNC: 8.9 MG/DL (ref 8.6–10.3)
CHLORIDE SERPL-SCNC: 104 MMOL/L (ref 98–107)
CO2 SERPL-SCNC: 27 MMOL/L (ref 21–32)
CREAT SERPL-MCNC: 0.81 MG/DL (ref 0.5–1.05)
EGFRCR SERPLBLD CKD-EPI 2021: >90 ML/MIN/1.73M*2
GLUCOSE SERPL-MCNC: 90 MG/DL (ref 74–99)
POTASSIUM SERPL-SCNC: 4.1 MMOL/L (ref 3.5–5.3)
PROT SERPL-MCNC: 6.1 G/DL (ref 6.4–8.2)
SODIUM SERPL-SCNC: 140 MMOL/L (ref 136–145)
TSH SERPL-ACNC: 1.68 MIU/L (ref 0.44–3.98)

## 2024-11-20 PROCEDURE — 77063 BREAST TOMOSYNTHESIS BI: CPT | Performed by: RADIOLOGY

## 2024-11-20 PROCEDURE — 77067 SCR MAMMO BI INCL CAD: CPT

## 2024-11-20 PROCEDURE — 84443 ASSAY THYROID STIM HORMONE: CPT

## 2024-11-20 PROCEDURE — 80053 COMPREHEN METABOLIC PANEL: CPT

## 2024-11-20 PROCEDURE — 77067 SCR MAMMO BI INCL CAD: CPT | Performed by: RADIOLOGY

## 2024-11-20 PROCEDURE — 82306 VITAMIN D 25 HYDROXY: CPT

## 2024-11-20 PROCEDURE — 36415 COLL VENOUS BLD VENIPUNCTURE: CPT

## 2024-12-16 ENCOUNTER — PATIENT MESSAGE (OUTPATIENT)
Dept: PRIMARY CARE | Facility: CLINIC | Age: 44
End: 2024-12-16
Payer: COMMERCIAL

## 2025-01-27 ENCOUNTER — TELEPHONE (OUTPATIENT)
Dept: PRIMARY CARE | Facility: CLINIC | Age: 45
End: 2025-01-27
Payer: COMMERCIAL

## 2025-01-28 NOTE — TELEPHONE ENCOUNTER
I do not believe she is on Nurtec anymore, I think she is on Ubrelvy for migraines.  Can you call and verify with patient? Thanks,  Marlene Valderrama, DO

## 2025-01-31 ENCOUNTER — PATIENT MESSAGE (OUTPATIENT)
Dept: PRIMARY CARE | Facility: CLINIC | Age: 45
End: 2025-01-31
Payer: COMMERCIAL

## 2025-02-21 DIAGNOSIS — E55.9 VITAMIN D DEFICIENCY: ICD-10-CM

## 2025-02-21 RX ORDER — ERGOCALCIFEROL 1.25 MG/1
CAPSULE ORAL
Qty: 12 CAPSULE | Refills: 3 | Status: SHIPPED | OUTPATIENT
Start: 2025-02-21

## 2025-04-10 ENCOUNTER — APPOINTMENT (OUTPATIENT)
Dept: PRIMARY CARE | Facility: CLINIC | Age: 45
End: 2025-04-10
Payer: COMMERCIAL

## 2025-04-10 VITALS
SYSTOLIC BLOOD PRESSURE: 100 MMHG | RESPIRATION RATE: 20 BRPM | WEIGHT: 239 LBS | DIASTOLIC BLOOD PRESSURE: 74 MMHG | TEMPERATURE: 96.8 F | OXYGEN SATURATION: 99 % | BODY MASS INDEX: 39.82 KG/M2 | HEIGHT: 65 IN | HEART RATE: 84 BPM

## 2025-04-10 DIAGNOSIS — F41.1 GENERALIZED ANXIETY DISORDER: ICD-10-CM

## 2025-04-10 DIAGNOSIS — G43.709 CHRONIC MIGRAINE WITHOUT AURA WITHOUT STATUS MIGRAINOSUS, NOT INTRACTABLE: ICD-10-CM

## 2025-04-10 DIAGNOSIS — R23.2 HOT FLASHES: ICD-10-CM

## 2025-04-10 DIAGNOSIS — E55.9 VITAMIN D DEFICIENCY: ICD-10-CM

## 2025-04-10 DIAGNOSIS — F33.1 MODERATE EPISODE OF RECURRENT MAJOR DEPRESSIVE DISORDER: Primary | ICD-10-CM

## 2025-04-10 PROCEDURE — 3008F BODY MASS INDEX DOCD: CPT | Performed by: FAMILY MEDICINE

## 2025-04-10 PROCEDURE — 1036F TOBACCO NON-USER: CPT | Performed by: FAMILY MEDICINE

## 2025-04-10 PROCEDURE — 99214 OFFICE O/P EST MOD 30 MIN: CPT | Performed by: FAMILY MEDICINE

## 2025-04-10 RX ORDER — ATENOLOL 50 MG/1
50 TABLET ORAL DAILY
Qty: 90 TABLET | Refills: 3 | Status: SHIPPED | OUTPATIENT
Start: 2025-04-10 | End: 2026-04-10

## 2025-04-10 RX ORDER — CLONIDINE 0.1 MG/24H
PATCH, EXTENDED RELEASE TRANSDERMAL
Qty: 4 PATCH | Refills: 2 | Status: SHIPPED | OUTPATIENT
Start: 2025-04-10 | End: 2026-04-10

## 2025-04-10 RX ORDER — DULOXETIN HYDROCHLORIDE 60 MG/1
60 CAPSULE, DELAYED RELEASE ORAL DAILY
Qty: 90 CAPSULE | Refills: 3 | Status: SHIPPED | OUTPATIENT
Start: 2025-04-10 | End: 2026-04-10

## 2025-04-10 RX ORDER — AMITRIPTYLINE HYDROCHLORIDE 25 MG/1
25 TABLET, FILM COATED ORAL NIGHTLY
Qty: 90 TABLET | Refills: 3 | Status: SHIPPED | OUTPATIENT
Start: 2025-04-10 | End: 2026-04-10

## 2025-04-10 RX ORDER — ERGOCALCIFEROL 1.25 MG/1
1.25 CAPSULE ORAL WEEKLY
Qty: 12 CAPSULE | Refills: 3 | Status: SHIPPED | OUTPATIENT
Start: 2025-04-10

## 2025-04-10 SDOH — ECONOMIC STABILITY: FOOD INSECURITY: WITHIN THE PAST 12 MONTHS, YOU WORRIED THAT YOUR FOOD WOULD RUN OUT BEFORE YOU GOT MONEY TO BUY MORE.: NEVER TRUE

## 2025-04-10 SDOH — ECONOMIC STABILITY: FOOD INSECURITY: WITHIN THE PAST 12 MONTHS, THE FOOD YOU BOUGHT JUST DIDN'T LAST AND YOU DIDN'T HAVE MONEY TO GET MORE.: NEVER TRUE

## 2025-04-10 ASSESSMENT — LIFESTYLE VARIABLES
HOW OFTEN DO YOU HAVE A DRINK CONTAINING ALCOHOL: MONTHLY OR LESS
HOW OFTEN DO YOU HAVE SIX OR MORE DRINKS ON ONE OCCASION: NEVER
HOW MANY STANDARD DRINKS CONTAINING ALCOHOL DO YOU HAVE ON A TYPICAL DAY: 1 OR 2
AUDIT-C TOTAL SCORE: 1
SKIP TO QUESTIONS 9-10: 1

## 2025-04-10 ASSESSMENT — PAIN SCALES - GENERAL: PAINLEVEL_OUTOF10: 2

## 2025-04-10 ASSESSMENT — PATIENT HEALTH QUESTIONNAIRE - PHQ9
SUM OF ALL RESPONSES TO PHQ9 QUESTIONS 1 & 2: 2
2. FEELING DOWN, DEPRESSED OR HOPELESS: SEVERAL DAYS
10. IF YOU CHECKED OFF ANY PROBLEMS, HOW DIFFICULT HAVE THESE PROBLEMS MADE IT FOR YOU TO DO YOUR WORK, TAKE CARE OF THINGS AT HOME, OR GET ALONG WITH OTHER PEOPLE: SOMEWHAT DIFFICULT
1. LITTLE INTEREST OR PLEASURE IN DOING THINGS: SEVERAL DAYS

## 2025-04-10 ASSESSMENT — ENCOUNTER SYMPTOMS
OCCASIONAL FEELINGS OF UNSTEADINESS: 0
DEPRESSION: 1
LOSS OF SENSATION IN FEET: 0

## 2025-04-10 NOTE — ASSESSMENT & PLAN NOTE
Stable continue duloxetine and atenolol  Orders:    atenolol (Tenormin) 50 mg tablet; Take 1 tablet (50 mg) by mouth once daily.

## 2025-04-10 NOTE — ASSESSMENT & PLAN NOTE
Having 1-2 migraines per month  Continue amitriptyline for prevention and Ubrelvy as abortive.  Ubrelvy increased from 50 mg to 100 mg to see if that works better for her.  Orders:    amitriptyline (Elavil) 25 mg tablet; Take 1 tablet (25 mg) by mouth once daily at bedtime.    ubrogepant (Ubrelvy) 100 mg tablet; Take 1 tablet (100 mg) by mouth once daily as needed (at onset of migraine).

## 2025-04-10 NOTE — PROGRESS NOTES
Subjective   Patient ID: Michelle Gonzales is a 44 y.o. female who presents for Depression and Migraine.  At her last visit I increased her dose of amitriptyline for migraines and she has noticed a decrease in migraine frequency.  She is now having a migraine 1 to 2/month instead of every week.  She does notice regular headaches and she associates those with barometric pressure changes or allergy symptoms.    She is concerned about her weight.  She states that she always feels hungry and craves sugar.    She has been out of work for the past 5.5 months and will now be starting a new job as a .    He continues to have hot flashes usually at least 10 a day.  She is taking soy supplement which seems to help a little bit.           Patient Care Team:  Marlene Valderrama DO as PCP - General  Marlene Valderrama DO as PCP - Robbi DOYLE PCP  Sofy Monahan MD as Consulting Physician (Otolaryngology)  Magno Merida MD as Consulting Physician (Obstetrics and Gynecology)    Current Outpatient Medications   Medication Sig Dispense Refill    acetaminophen (Tylenol) 500 mg tablet Take 2 tablets (1,000 mg) by mouth every 6 hours. Take 1 ibuprofen with 2 extra Tylenol every 6 hours for control of pain 100 tablet 0    Afrin No Drip,oxymetazolin, 0.05 % mist USE 2 SPRAYS IN EACH NOSTRIL 2 TIMES A DAY FOR 3 DAYS      albuterol (Ventolin HFA) 90 mcg/actuation inhaler Inhale 2 puffs every 4 hours if needed for wheezing or shortness of breath. 8 g 0    azelastine (Astelin) 137 mcg (0.1 %) nasal spray Administer 2 sprays into each nostril 2 times a day.      cetirizine (ZyrTEC) 10 mg tablet Take 1 tablet (10 mg) by mouth once daily as needed.      diphenhydrAMINE (BENADryl) 12.5 mg chewable tablet Chew 1 tablet (12.5 mg) every 6 hours if needed for allergies.      ELDERBERRY FRUIT ORAL Take by mouth.      fexofenadine (Allegra) 180 mg tablet Take 1 tablet (180 mg) by mouth once daily.      fluticasone (Flonase) 50 mcg/actuation  "nasal spray 2 sprays once daily. Shake liquid prior to use.      montelukast (Singulair) 10 mg tablet Take 1 tablet by mouth every day for allergies or asthma 90 tablet 3    multivitamin tablet Take 1 tablet by mouth once daily.      rivaroxaban (Xarelto) 10 mg tablet Take 1 tablet (10 mg) by mouth once daily. 30 tablet 11    saccharomyces boulardii (Florastor) 250 mg capsule Take 1 capsule (250 mg) by mouth 2 times a day. TAKING PROBIOTIC      soy isoflavone (Soy Isoflavones) 40 mg tablet Take 1 tablet by mouth 2 times a day.      amitriptyline (Elavil) 25 mg tablet Take 1 tablet (25 mg) by mouth once daily at bedtime. 90 tablet 3    atenolol (Tenormin) 50 mg tablet Take 1 tablet (50 mg) by mouth once daily. 90 tablet 3    cloNIDine (Catapres-TTS) 0.1 mg/24 hr patch Apply one patch on the skin and replace every 7 days, as directed 4 patch 2    DULoxetine (Cymbalta) 60 mg DR capsule Take 1 capsule (60 mg) by mouth once daily. 90 capsule 3    ergocalciferol (Vitamin D-2) 1250 mcg (50,000 units) capsule Take 1 capsule (1.25 mg) by mouth 1 (one) time per week. 12 capsule 3    ubrogepant (Ubrelvy) 100 mg tablet Take 1 tablet (100 mg) by mouth once daily as needed (at onset of migraine). 14 tablet 11     No current facility-administered medications for this visit.       Objective     Visit Vitals  /74 (BP Location: Right arm, Patient Position: Sitting, BP Cuff Size: Adult long)   Pulse 84   Temp 36 °C (96.8 °F) (Temporal)   Resp 20   Ht 1.651 m (5' 5\")   Wt 108 kg (239 lb)   LMP 10/08/2021   SpO2 99%   BMI 39.77 kg/m²   OB Status Hysterectomy   Smoking Status Never   BSA 2.23 m²        Constitutional: Well nourished, well developed, appears stated age  Eyes: no scleral icterus, no conjunctival injection  Respiratory: normal respiratory effort  Musculoskeletal: No gross deformities appreciated  Skin: Warm, dry.  Neurologic: Alert, CNs II-XII grossly intact..  Psych: Appropriate mood and affect.        Assessment & " Plan  Moderate episode of recurrent major depressive disorder  Stable continue duloxetine  Orders:    DULoxetine (Cymbalta) 60 mg DR capsule; Take 1 capsule (60 mg) by mouth once daily.    Generalized anxiety disorder  Stable continue duloxetine and atenolol  Orders:    atenolol (Tenormin) 50 mg tablet; Take 1 tablet (50 mg) by mouth once daily.    Chronic migraine without aura without status migrainosus, not intractable  Having 1-2 migraines per month  Continue amitriptyline for prevention and Ubrelvy as abortive.  Ubrelvy increased from 50 mg to 100 mg to see if that works better for her.  Orders:    amitriptyline (Elavil) 25 mg tablet; Take 1 tablet (25 mg) by mouth once daily at bedtime.    ubrogepant (Ubrelvy) 100 mg tablet; Take 1 tablet (100 mg) by mouth once daily as needed (at onset of migraine).    Vitamin D deficiency    Orders:    ergocalciferol (Vitamin D-2) 1250 mcg (50,000 units) capsule; Take 1 capsule (1.25 mg) by mouth 1 (one) time per week.    Hot flashes  HRT is contraindicated we will have her try clonidine patches  Orders:    cloNIDine (Catapres-TTS) 0.1 mg/24 hr patch; Apply one patch on the skin and replace every 7 days, as directed       Follow up 6 months.  I tried doing a prior Auth for Zepbound for sleep apnea but it was denied by insurance.    Marlene Valderrama, DO

## 2025-04-10 NOTE — ASSESSMENT & PLAN NOTE
Stable continue duloxetine  Orders:    DULoxetine (Cymbalta) 60 mg DR capsule; Take 1 capsule (60 mg) by mouth once daily.

## 2025-04-10 NOTE — ASSESSMENT & PLAN NOTE
HRT is contraindicated we will have her try clonidine patches  Orders:    cloNIDine (Catapres-TTS) 0.1 mg/24 hr patch; Apply one patch on the skin and replace every 7 days, as directed

## 2025-04-10 NOTE — PATIENT INSTRUCTIONS
Thank you for choosing [facility] for your healthcare.   As always if you have any questions or concerns please do not hesitate to call our office at [phone] or through [employer].    Have a great day!  Marlene Valderrama, DO

## 2025-05-12 DIAGNOSIS — I26.99 PULMONARY EMBOLISM WITHOUT ACUTE COR PULMONALE, UNSPECIFIED CHRONICITY, UNSPECIFIED PULMONARY EMBOLISM TYPE (MULTI): ICD-10-CM

## 2025-06-23 ENCOUNTER — APPOINTMENT (OUTPATIENT)
Dept: CARDIOLOGY | Facility: CLINIC | Age: 45
End: 2025-06-23
Payer: COMMERCIAL

## 2025-07-02 ENCOUNTER — PATIENT MESSAGE (OUTPATIENT)
Dept: PRIMARY CARE | Facility: CLINIC | Age: 45
End: 2025-07-02
Payer: COMMERCIAL

## 2025-08-04 ENCOUNTER — OFFICE VISIT (OUTPATIENT)
Dept: CARDIOLOGY | Facility: CLINIC | Age: 45
End: 2025-08-04
Payer: COMMERCIAL

## 2025-08-04 VITALS
HEART RATE: 74 BPM | HEIGHT: 65 IN | BODY MASS INDEX: 39.32 KG/M2 | WEIGHT: 236 LBS | SYSTOLIC BLOOD PRESSURE: 112 MMHG | DIASTOLIC BLOOD PRESSURE: 74 MMHG | OXYGEN SATURATION: 99 %

## 2025-08-04 DIAGNOSIS — I26.99 PULMONARY EMBOLISM WITHOUT ACUTE COR PULMONALE, UNSPECIFIED CHRONICITY, UNSPECIFIED PULMONARY EMBOLISM TYPE (MULTI): Primary | ICD-10-CM

## 2025-08-04 DIAGNOSIS — L03.115 CELLULITIS OF RIGHT LOWER EXTREMITY: ICD-10-CM

## 2025-08-04 PROCEDURE — 1036F TOBACCO NON-USER: CPT | Performed by: INTERNAL MEDICINE

## 2025-08-04 PROCEDURE — 99212 OFFICE O/P EST SF 10 MIN: CPT | Performed by: INTERNAL MEDICINE

## 2025-08-04 PROCEDURE — 3008F BODY MASS INDEX DOCD: CPT | Performed by: INTERNAL MEDICINE

## 2025-08-04 PROCEDURE — 99214 OFFICE O/P EST MOD 30 MIN: CPT | Performed by: INTERNAL MEDICINE

## 2025-08-04 RX ORDER — ASCORBIC ACID 125 MG
TABLET,CHEWABLE ORAL
COMMUNITY

## 2025-08-04 RX ORDER — CEPHALEXIN 500 MG/1
500 CAPSULE ORAL 4 TIMES DAILY
Qty: 40 CAPSULE | Refills: 0 | Status: SHIPPED | OUTPATIENT
Start: 2025-08-04 | End: 2025-08-14

## 2025-08-04 ASSESSMENT — PAIN SCALES - GENERAL: PAINLEVEL_OUTOF10: 0-NO PAIN

## 2025-08-04 NOTE — PROGRESS NOTES
"Vascular Medicine established visit    History of Present Illness:  Michelle Gonzales is a/an 45 y.o. woman who follows up for minimally provoked VTE; on rivaroxaban 10 mg daily. Seen originally in 2020 for PE that occurred after having been really sedentary including a period of three days in bed with about 30 hours asleep. Subsequently had a drive to the [employer] and developed right flank pain. Eventually diagnosed with PE.     Full hysterectomy in January. Had ovaries removed so has a lot of symptoms related to the menopause.     In 2018 had b/l leg swelling with sensitivity and warmth; admitted and treated for cellulitis. Post discharge had recurrent swelling each month. Eventually saw Dr. Hamm and got a left iliac vein stent.     She was last seen on 6/17/2024.    She denies bleeding including epistaxis, gingival bleeding, hemoptysis, hematemesis, hematochezia, melena, hematuria, and vaginal bleeding.    Right leg is a little red and tender. Has been going on for about a week. Mostly wears compression socks at work.    Medical History[1]  Surgical History[2]  Social History[3]  Family History[4]  Current Medications[5]    Physical Examination:  Blood pressure 112/74, pulse 74, height 1.651 m (5' 5\"), weight 107 kg (236 lb), last menstrual period 10/08/2021, SpO2 99%.  No distress  No JVD or carotid bruits  Lungs clear bilaterally  Heart regular and without murmurs  Abdomen soft and non-tender  No leg swelling; mild erythema posterior right calf, no skin lesions  Pulses intact      Pertinent Labs:  Component      Latest Ref Rng 11/20/2024   GLUCOSE      74 - 99 mg/dL 90    SODIUM      136 - 145 mmol/L 140    POTASSIUM      3.5 - 5.3 mmol/L 4.1    CHLORIDE      98 - 107 mmol/L 104    Bicarbonate      21 - 32 mmol/L 27    Anion Gap      10 - 20 mmol/L 13    Blood Urea Nitrogen      6 - 23 mg/dL 14    Creatinine      0.50 - 1.05 mg/dL 0.81    EGFR      >60 mL/min/1.73m*2 >90    Calcium      8.6 - 10.3 mg/dL 8.9  "   Albumin      3.4 - 5.0 g/dL 3.8    Alkaline Phosphatase      33 - 110 U/L 119 (H)    Total Protein      6.4 - 8.2 g/dL 6.1 (L)    AST      9 - 39 U/L 16    Bilirubin Total      0.0 - 1.2 mg/dL 0.4    ALT      7 - 45 U/L 18       Legend:  (H) High  (L) Low    Pertinent Imaging:    Diagnoses and all orders for this visit:  Pulmonary embolism without acute cor pulmonale, unspecified chronicity, unspecified pulmonary embolism type (Multi) (Primary)  Cellulitis of right lower extremity  -     cephalexin (Keflex) 500 mg capsule; Take 1 capsule (500 mg) by mouth 4 times a day for 10 days.  Continue indefinite anticoagulation     Follow up in one year.    Trinh Espinoza MD, MS           [1]   Past Medical History:  Diagnosis Date    Abscess of eyelid right eye, unspecified eyelid 05/23/2022    Cellulitis of right eyelid    Acute maxillary sinusitis, unspecified 01/12/2022    Acute non-recurrent maxillary sinusitis    Acute upper respiratory infection, unspecified 12/29/2021    URI with cough and congestion    Allergic     Anxiety     Chronic embolism and thrombosis of unspecified deep veins of right lower extremity 03/19/2020    Chronic deep vein thrombosis (DVT) of right lower extremity, unspecified vein    Depression     Encounter for screening for COVID-19 03/13/2021    Encounter for screening for COVID-19    Encounter for surveillance of contraceptive pills 08/03/2020    Encounter for birth control pills maintenance    HPV (human papilloma virus) infection     Hypoactive sexual desire disorder 01/28/2020    Lack of libido    Localized edema 03/19/2020    Leg edema, right    Migraine     Nausea with vomiting, unspecified 08/18/2021    Nausea, vomiting, and diarrhea    Other headache syndrome 12/29/2021    Other headache syndrome    Other hypersomnia 06/24/2021    Excessive daytime sleepiness    Other pulmonary embolism without acute cor pulmonale 05/19/2021    Other acute pulmonary embolism without acute cor  pulmonale    Other specified soft tissue disorders 06/16/2020    Swelling of both lower extremities    Pain, unspecified 08/07/2020    Body aches    Pelvic and perineal pain 11/02/2021    Pelvic pain in female    Peptic ulceration     Personal history of cervical dysplasia     History of cervical dysplasia    Personal history of diseases of the skin and subcutaneous tissue 01/28/2020    History of female hirsutism    Personal history of other diseases of the respiratory system     History of allergic rhinitis    Personal history of other diseases of the respiratory system     History of sinusitis    Personal history of other diseases of the respiratory system     History of bronchitis    Personal history of other mental and behavioral disorders     History of depression    Personal history of other mental and behavioral disorders     History of anxiety    Personal history of other specified conditions     History of insomnia    Personal history of other specified conditions 12/13/2021    History of urinary urgency    Personal history of other specified conditions 01/08/2021    History of headache    Personal history of other specified conditions 03/13/2021    History of headache    Personal history of other specified conditions 08/07/2020    History of fatigue    Personal history of pneumonia (recurrent) 01/12/2022    History of pneumonia    Personal history of pneumonia (recurrent)     History of pneumonia    PONV (postoperative nausea and vomiting)     Pulmonary embolism     Sleep apnea 2019    Urinary tract infection    [2]   Past Surgical History:  Procedure Laterality Date    ABLATION OF DYSRHYTHMIC FOCUS  2019    BILATERAL OOPHORECTOMY Bilateral 01/23/2024    CHOLECYSTECTOMY      ENDOMETRIAL ABLATION      HYSTERECTOMY      OTHER SURGICAL HISTORY  10/04/2019    Bunionectomy    OTHER SURGICAL HISTORY  10/04/2019    Myringotomy with tube placement    OTHER SURGICAL HISTORY  09/15/2020    Colonoscopy    OTHER  SURGICAL HISTORY  09/15/2020    Esophagogastroduodenoscopy    OTHER SURGICAL HISTORY  09/15/2020    [place] tooth extraction    OTHER SURGICAL HISTORY  2021    Ablation    OTHER SURGICAL HISTORY  2021    Bilateral salpingectomy    OTHER SURGICAL HISTORY  2019    Vascular surgical procedure    VEIN SURGERY  2019    WISDOM TOOTH EXTRACTION     [3]   Social History  Tobacco Use    Smoking status: Never     Passive exposure: Past    Smokeless tobacco: Never    Tobacco comments:     I was exposed to heavy second hand smoke until about the age of 17 or 18, but I have never smoked.   Vaping Use    Vaping status: Never Used   Substance Use Topics    Alcohol use: Yes     Alcohol/week: 4.0 standard drinks of alcohol     Types: 1 Glasses of wine, 2 Shots of liquor, 1 Standard drinks or equivalent per week     Comment: I am not much of a drinker.  The above is an overestimation.    Drug use: Never   [4]   Family History  Problem Relation Name Age of Onset    Anxiety disorder Mother  - LEORA. Antonia Farias     Depression Mother  - LEORA. Antonia Farias     Hypertension Mother  - JHONATAN Farias     Lung cancer Mother  - . Antonia Farias     Cancer Mother  - . Antonia Farias     Other (cardiac disorder) Father Tad Farias     Hearing loss Father Tad Farias     Heart disease Father Tad Farias     Other (substance abuse) Brother Magno Farias     Alcohol abuse Brother Magno Jarrett     Stroke Maternal Grandmother  - [facility]     Coronary artery disease Maternal Grandmother  - [facility]     Hypertension Maternal Grandmother  - [facility]     Diabetes Maternal Grandmother  - [facility]     Heart disease Maternal Grandfather LILY Benedictanthony Brizuela     Diabetes type I Maternal Grandmother  - [facility]    [5]   Current Outpatient Medications   Medication Sig Dispense Refill    acetaminophen (Tylenol) 500 mg tablet Take 2  tablets (1,000 mg) by mouth every 6 hours. Take 1 ibuprofen with 2 extra Tylenol every 6 hours for control of pain 100 tablet 0    Afrin No Drip,oxymetazolin, 0.05 % mist USE 2 SPRAYS IN EACH NOSTRIL 2 TIMES A DAY FOR 3 DAYS      amitriptyline (Elavil) 25 mg tablet Take 1 tablet (25 mg) by mouth once daily at bedtime. 90 tablet 3    atenolol (Tenormin) 50 mg tablet Take 1 tablet (50 mg) by mouth once daily. 90 tablet 3    azelastine (Astelin) 137 mcg (0.1 %) nasal spray Administer 2 sprays into each nostril 2 times a day.      cetirizine (ZyrTEC) 10 mg tablet Take 1 tablet (10 mg) by mouth once daily as needed.      diphenhydrAMINE (BENADryl) 12.5 mg chewable tablet Chew and swallow 1 tablet (12.5 mg) every 6 hours if needed for allergies.      DULoxetine (Cymbalta) 60 mg DR capsule Take 1 capsule (60 mg) by mouth once daily. 90 capsule 3    ELDERBERRY FRUIT ORAL Take by mouth.      ergocalciferol (Vitamin D-2) 1250 mcg (50,000 units) capsule Take 1 capsule (1.25 mg) by mouth 1 (one) time per week. 12 capsule 3    fexofenadine (Allegra) 180 mg tablet Take 1 tablet (180 mg) by mouth once daily.      fluticasone (Flonase) 50 mcg/actuation nasal spray 2 sprays once daily. Shake liquid prior to use.      montelukast (Singulair) 10 mg tablet Take 1 tablet by mouth every day for allergies or asthma 90 tablet 3    multivitamin tablet Take 1 tablet by mouth once daily.      rivaroxaban (Xarelto) 10 mg tablet Take 1 tablet (10 mg) by mouth once daily. 30 tablet 11    saccharomyces boulardii (Florastor) 250 mg capsule Take 1 capsule (250 mg) by mouth 2 times a day. TAKING PROBIOTIC      soy isoflavone (Soy Isoflavones) 40 mg tablet Take 1 tablet by mouth 2 times a day.      ubrogepant (Ubrelvy) 100 mg tablet Take 1 tablet (100 mg) by mouth once daily as needed (at onset of migraine). 14 tablet 11    albuterol (Ventolin HFA) 90 mcg/actuation inhaler Inhale 2 puffs every 4 hours if needed for wheezing or shortness of breath.  (Patient not taking: Reported on 8/4/2025) 8 g 0     No current facility-administered medications for this visit.

## 2025-09-03 ENCOUNTER — OFFICE VISIT (OUTPATIENT)
Dept: URGENT CARE | Age: 45
End: 2025-09-03
Payer: COMMERCIAL

## 2025-09-03 VITALS
OXYGEN SATURATION: 98 % | RESPIRATION RATE: 17 BRPM | DIASTOLIC BLOOD PRESSURE: 73 MMHG | SYSTOLIC BLOOD PRESSURE: 111 MMHG | TEMPERATURE: 97.3 F | HEART RATE: 66 BPM

## 2025-09-03 DIAGNOSIS — R19.7 DIARRHEA, UNSPECIFIED TYPE: ICD-10-CM

## 2025-09-03 DIAGNOSIS — Z20.822 SUSPECTED COVID-19 VIRUS INFECTION: Primary | ICD-10-CM

## 2025-09-03 LAB
POC BILIRUBIN, URINE: NEGATIVE
POC BLOOD, URINE: NEGATIVE
POC CORONAVIRUS SARS-COV-2 PCR: NEGATIVE
POC GLUCOSE, URINE: NEGATIVE MG/DL
POC HUMAN RHINOVIRUS PCR: NEGATIVE
POC INFLUENZA A VIRUS PCR: NEGATIVE
POC INFLUENZA B VIRUS PCR: NEGATIVE
POC KETONES, URINE: NEGATIVE MG/DL
POC LEUKOCYTES, URINE: ABNORMAL
POC NITRITE,URINE: NEGATIVE
POC PH, URINE: 6 PH
POC PROTEIN, URINE: NEGATIVE MG/DL
POC RESPIRATORY SYNCYTIAL VIRUS PCR: NEGATIVE
POC SPECIFIC GRAVITY, URINE: 1.02
POC UROBILINOGEN, URINE: 0.2 EU/DL

## 2025-09-03 ASSESSMENT — ENCOUNTER SYMPTOMS
BLOOD IN STOOL: 0
VOMITING: 0
COUGH: 0
FATIGUE: 1
DIARRHEA: 1
NAUSEA: 1
CHILLS: 1
SHORTNESS OF BREATH: 0
ABDOMINAL PAIN: 1
RHINORRHEA: 1
DIAPHORESIS: 1

## 2025-09-05 LAB — BACTERIA UR CULT: NORMAL

## 2025-10-21 ENCOUNTER — APPOINTMENT (OUTPATIENT)
Dept: PRIMARY CARE | Facility: CLINIC | Age: 45
End: 2025-10-21
Payer: COMMERCIAL

## 2025-11-12 ENCOUNTER — APPOINTMENT (OUTPATIENT)
Dept: OBSTETRICS AND GYNECOLOGY | Facility: CLINIC | Age: 45
End: 2025-11-12
Payer: COMMERCIAL

## (undated) DEVICE — DRESSING, TRANSPARENT, TEGADERM, 2-3/8 X 2-3/4 IN

## (undated) DEVICE — SOLUTION, IRRIGATION, SODIUM CHLORIDE 0.9%, 1000 ML, POUR BOTTLE

## (undated) DEVICE — APPLICATOR, CHLORAPREP, W/ORANGE TINT, 26ML

## (undated) DEVICE — SLEEVE, KII, Z-THREAD, 5X100CM

## (undated) DEVICE — APPLICATOR, ARISTA, FLEXITIP, XL, LF

## (undated) DEVICE — DRESSING, GAUZE, SPONGE, 8 PLY, CURITY, 2 X 2 IN, STERILE

## (undated) DEVICE — PAD, GROUNDING, ELECTROSURGICAL, W/9 FT CABLE, POLYHESIVE II, ADULT, LF

## (undated) DEVICE — LIGASURE, V SEALER/DIVIDER  5MM BLUNT TIP

## (undated) DEVICE — ASSEMBLY, STRYKER FLOW 2, SUCTION IRRIGATOR, WITH TIP

## (undated) DEVICE — STRIP, SKIN CLOSURE, STERI STRIP, REINFORCED, 0.5 X 4 IN

## (undated) DEVICE — SYRINGE, 10 CC, LUER LOCK

## (undated) DEVICE — 18FR, 2-WAY, 5CC BARDEX ALL-SILICONE FOLEY CATHETER, UNCOATED

## (undated) DEVICE — RETRIEVAL SYSTEM, MONARCH, 10MM DISP ENDOSCOPIC

## (undated) DEVICE — TOWEL PACK, STERILE, 4/PACK, BLUE

## (undated) DEVICE — HEMOSTAT, ARISTA, ABSORBABLE, 3 GRAMS

## (undated) DEVICE — ACCESS SYS, KII SHIELDED BLADED, Z-THREAD, 12X100CM

## (undated) DEVICE — GLOVE, PROTEXIS PI CLASSIC, SZ-7.5, PF, LF

## (undated) DEVICE — PAD, SANITARY, OBSTETRICAL, W/ADHSV STRIP,11 IN,LF

## (undated) DEVICE — SUTURE, VICRYL, 4-0, 18 IN, PS2, UNDYED

## (undated) DEVICE — SWABSTICK, PREP, IODOPHOR, PVP, 8 IN

## (undated) DEVICE — Device

## (undated) DEVICE — COVER HANDLE LIGHT, STERIS, BLUE, STERILE